# Patient Record
Sex: FEMALE | Race: ASIAN | NOT HISPANIC OR LATINO | Employment: UNEMPLOYED | URBAN - METROPOLITAN AREA
[De-identification: names, ages, dates, MRNs, and addresses within clinical notes are randomized per-mention and may not be internally consistent; named-entity substitution may affect disease eponyms.]

---

## 2023-03-06 ENCOUNTER — TELEPHONE (OUTPATIENT)
Dept: FAMILY MEDICINE | Facility: CLINIC | Age: 18
End: 2023-03-06
Payer: COMMERCIAL

## 2023-03-06 NOTE — TELEPHONE ENCOUNTER
Medication Question or Refill    Contacts       Type Contact Phone/Fax    03/06/2023 04:43 PM CST Phone (Incoming) Cyndie Mae (Self) [phone] (H)          What medication are you calling about (include dose and sig)?: SERTRALINE 50 MG, ONCE A DAY     Preferred Pharmacy:   [facility]   [address] [place] [address]  [phone]          Controlled Substance Agreement on file:   CSA -- Patient Level:    CSA: None found at the patient level.       Who prescribed the medication?: UNKNOWN    Do you need a refill? Yes    When did you use the medication last? TODAY,3/6/23    Patient offered an appointment? Yes: SHE SCHEDULED AN APPOINTMENT ON 3/20/23    Do you have any questions or concerns?  Yes: SHE ONLY HAS 10 PILLS LEFT AND IS CONCERNED ABOUT BEING OFF THE MEDICATION      Okay to leave a detailed message?: Yes at Cell number on file:    Telephone Information:   Mobile [phone]

## 2023-03-07 NOTE — TELEPHONE ENCOUNTER
Sertraline is not on med list. Patient is scheduled with Nemo Myers on 3/20/23.Nemo Bourne MA/JOANIE

## 2023-03-07 NOTE — TELEPHONE ENCOUNTER
Called and spoke with patient regarding refill request for Sertraline.  Patient is not established with , last seen as child back in 2013.  Has been doctoring with providers with [facility].  provider is one who last prescribed patient the Sertraline.  Patient looking to transfer to , has apt to establish care with Kristen Kehr, PA-C on 3/20/23.  Patient noting she will run out of her current supply of Sertraline, prior to apt on 3/20.    Since patient has never been seen by provider, Kehr, is needing to establish care with , recommended patient to contact her previous prescribing provider with  to request a refill or extension on Sertraline, to last at least until upcoming visit with new provider here at .  Our office would not be able to provide refill for patient without visit and establishing care first.    Patient voiced good understanding, will contact her previous clinic, [facility], and inquire about short term refill on Sertraline.  Will start with  with initial visit on 3/20/23.      Jennifer Palumbo RN  [facility]

## 2023-03-20 ENCOUNTER — OFFICE VISIT (OUTPATIENT)
Dept: FAMILY MEDICINE | Facility: CLINIC | Age: 18
End: 2023-03-20
Payer: COMMERCIAL

## 2023-03-20 VITALS
BODY MASS INDEX: 28.82 KG/M2 | OXYGEN SATURATION: 94 % | HEART RATE: 80 BPM | DIASTOLIC BLOOD PRESSURE: 86 MMHG | RESPIRATION RATE: 20 BRPM | HEIGHT: 65 IN | TEMPERATURE: 98.5 F | SYSTOLIC BLOOD PRESSURE: 138 MMHG | WEIGHT: 173 LBS

## 2023-03-20 DIAGNOSIS — F32.A ANXIETY AND DEPRESSION: Primary | ICD-10-CM

## 2023-03-20 DIAGNOSIS — F41.9 ANXIETY AND DEPRESSION: Primary | ICD-10-CM

## 2023-03-20 PROCEDURE — 99204 OFFICE O/P NEW MOD 45 MIN: CPT | Performed by: PHYSICIAN ASSISTANT

## 2023-03-20 RX ORDER — SERTRALINE HYDROCHLORIDE 100 MG/1
100 TABLET, FILM COATED ORAL DAILY
Qty: 90 TABLET | Refills: 0 | Status: SHIPPED | OUTPATIENT
Start: 2023-03-20 | End: 2023-06-20

## 2023-03-20 ASSESSMENT — ANXIETY QUESTIONNAIRES
1. FEELING NERVOUS, ANXIOUS, OR ON EDGE: NEARLY EVERY DAY
IF YOU CHECKED OFF ANY PROBLEMS ON THIS QUESTIONNAIRE, HOW DIFFICULT HAVE THESE PROBLEMS MADE IT FOR YOU TO DO YOUR WORK, TAKE CARE OF THINGS AT HOME, OR GET ALONG WITH OTHER PEOPLE: VERY DIFFICULT
7. FEELING AFRAID AS IF SOMETHING AWFUL MIGHT HAPPEN: NEARLY EVERY DAY
6. BECOMING EASILY ANNOYED OR IRRITABLE: NEARLY EVERY DAY
3. WORRYING TOO MUCH ABOUT DIFFERENT THINGS: NEARLY EVERY DAY
7. FEELING AFRAID AS IF SOMETHING AWFUL MIGHT HAPPEN: NEARLY EVERY DAY
GAD7 TOTAL SCORE: 21
2. NOT BEING ABLE TO STOP OR CONTROL WORRYING: NEARLY EVERY DAY
5. BEING SO RESTLESS THAT IT IS HARD TO SIT STILL: NEARLY EVERY DAY
8. IF YOU CHECKED OFF ANY PROBLEMS, HOW DIFFICULT HAVE THESE MADE IT FOR YOU TO DO YOUR WORK, TAKE CARE OF THINGS AT HOME, OR GET ALONG WITH OTHER PEOPLE?: VERY DIFFICULT
4. TROUBLE RELAXING: NEARLY EVERY DAY
GAD7 TOTAL SCORE: 21
GAD7 TOTAL SCORE: 21

## 2023-03-20 ASSESSMENT — PATIENT HEALTH QUESTIONNAIRE - PHQ9
SUM OF ALL RESPONSES TO PHQ QUESTIONS 1-9: 23
10. IF YOU CHECKED OFF ANY PROBLEMS, HOW DIFFICULT HAVE THESE PROBLEMS MADE IT FOR YOU TO DO YOUR WORK, TAKE CARE OF THINGS AT HOME, OR GET ALONG WITH OTHER PEOPLE: SOMEWHAT DIFFICULT
SUM OF ALL RESPONSES TO PHQ QUESTIONS 1-9: 23

## 2023-03-20 ASSESSMENT — PAIN SCALES - GENERAL: PAINLEVEL: NO PAIN (0)
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
## 2023-03-20 NOTE — PROGRESS NOTES
"  Assessment & Plan     Anxiety and depression  She will increase the dose of the sertraline to 100 mg daily.   [facility] Jazmynearl Cheatham will be contacting to schedule and start counseling.   Possible transition to Psychiatry since more evaluation may be necessary for other mental health diagnosis.   I will see her back in 2-3 months and closely work with [facility].   - sertraline (ZOLOFT) 100 MG tablet; Take 1 tablet (100 mg) by mouth daily      30 minutes spent on the date of the encounter doing chart review, review of outside records, patient visit and documentation        BMI:   Estimated body mass index is 29.01 kg/m  as calculated from the following:    Height as of this encounter: 1.645 m (5' 4.75\").    Weight as of this encounter: 78.5 kg (173 lb).   Weight management plan: will discuss at future appointments    Depression Screening Follow Up    PHQ 3/20/2023   PHQ-9 Total Score 23   Q9: Thoughts of better off dead/self-harm past 2 weeks More than half the days   F/U: Thoughts of suicide or self-harm Yes   F/U: Self harm-plan No   F/U: Self-harm action No   F/U: Safety concerns No     Last PHQ-9 3/20/2023   1.  Little interest or pleasure in doing things 2   2.  Feeling down, depressed, or hopeless 3   3.  Trouble falling or staying asleep, or sleeping too much 3   4.  Feeling tired or having little energy 3   5.  Poor appetite or overeating 2   6.  Feeling bad about yourself 3   7.  Trouble concentrating 3   8.  Moving slowly or restless 2   Q9: Thoughts of better off dead/self-harm past 2 weeks 2   PHQ-9 Total Score 23   In the past two weeks have you had thoughts of suicide or self harm? Yes   Do you have concerns about your personal safety or the safety of others? No   In the past 2 weeks have you thought about a plan or had intention to harm yourself? No   In the past 2 weeks have you acted on these thoughts in any way? No               Follow Up      Follow Up Actions Taken  see above, [facility] referral    Discussed " the following ways the patient can remain in a safe environment:  be around others      Return in about 3 months (around 6/20/2023) for depression / anxiety.    Kristen M. Kehr, PA-C  [facility]   Cyndie is a 17 year old, presenting for the following health issues:  Depression and Anxiety      Cyndie has been a patient at [facility] and treated with sertraline 50 mg daily.     She had an insurance change and is establishing here at [facility] for continued treatment.     She was diagnosed with anxiety and depression. She has been seen by counseling at school, but does not sound like she has had mental health counseling. Her counselor at school is mainly helping with her classes and trying to transition to a different major. She is attending college at [employer]. She was previously studying pre-engineering, but is in the process of changing majors. She found the load of her classes in engineering was too stressful.    She lives with her grandparents in [place]. She previously lived in [place].   She was born in [place]. She recalls moving to [place] to live her her grandparents when she was in 2nd grade. She does not know why, but is aware that her father had alcohol abuse. She eventually moved [facility], to [place] with her parents, but returned to [place] to permanently stay with her grandparents when she was in middle school. She graduated from [employer]. She recalls having panic symptoms and seeing the school nurse as early as elementary school, but was never treated by mental health or counseling. She was able to keep up with her class load during high school. She reports having good grades. College transition was difficult and this is when she decided to seek care for her mental health.   She has a stable home, no food insecurity.   She does not sleep well. Typically making lists and needing to complete all tasks before  she is able to rest.   She also reports feelings of depression with a history of self cutting, no current harmful behavior. She also has thoughts of not being here. No active thoughts of suicide.     She and her previous provider were discussing other mental health diagnosis such as autism, but she did not go forward with any evaluations because her insurance changed.     History of Present Illness       Mental Health Follow-up:  Patient presents to follow-up on Depression & Anxiety.Patient's depression since last visit has been:  Medium  The patient is having other symptoms associated with depression.  Patient's anxiety since last visit has been:  Bad  The patient is having other symptoms associated with anxiety.  Any significant life events: relationship concerns and other  Patient is feeling anxious or having panic attacks.  Patient has no concerns about alcohol or drug use.    She eats 0-1 servings of fruits and vegetables daily.She consumes 0 sweetened beverage(s) daily.She exercises with enough effort to increase her heart rate 9 or less minutes per day.  She exercises with enough effort to increase her heart rate 3 or less days per week. She is missing 1 dose(s) of medications per week.  She is not taking prescribed medications regularly due to remembering to take.    Today's PHQ-9         PHQ-9 Total Score: 23    PHQ-9 Q9 Thoughts of better off dead/self-harm past 2 weeks :   More than half the days  Thoughts of suicide or self harm: (P) Yes  Self-harm Plan:   (P) No  Self-harm Action:     (P) No  Safety concerns for self or others: (P) No    How difficult have these problems made it for you to do your work, take care of things at home, or get along with other people: Somewhat difficult  Today's BRAN-7 Score: 21     {ALERT  Recent PHQ-9 score indicates suicidal ideations         Review of Systems   Constitutional, HEENT, cardiovascular, pulmonary, GI, , musculoskeletal, neuro, skin, endocrine and psych  "systems are negative, except as otherwise noted.      Objective    /86   Pulse 80   Temp 98.5  F (36.9  C) (Tympanic)   Resp 20   Ht 1.645 m (5' 4.75\")   Wt 78.5 kg (173 lb)   SpO2 94%   BMI 29.01 kg/m    Body mass index is 29.01 kg/m .  Physical Exam   GENERAL: healthy, alert and no distress  MS: no gross musculoskeletal defects noted, no edema  NEURO: Normal strength and tone, mentation intact and speech normal  PSYCH: mentation appears normal, affect normal/bright                    "

## 2023-03-20 NOTE — PATIENT INSTRUCTIONS
Call your insurance (contact on the back of your insurance card)    Ask where you can be seen for Psychiatry - ? Testing for autism / counseling / evaluation.         NATE Watts, [facility]  Behavioral Health Clinician  [facility]      Two says to schedule:  1)  - Ask to schedule a NEW appoinment with Jazmyn Cheatham the [facility]  2) Scheduling line - Call [phone] and request a NEW appoinment with dillon Watts [facility] at the [facility]

## 2023-03-20 NOTE — NURSING NOTE
"Chief Complaint   Patient presents with     Depression     Anxiety       Initial /86   Pulse 80   Temp 98.5  F (36.9  C) (Tympanic)   Resp 20   Ht 1.645 m (5' 4.75\")   Wt 78.5 kg (173 lb)   SpO2 94%   BMI 29.01 kg/m   Estimated body mass index is 29.01 kg/m  as calculated from the following:    Height as of this encounter: 1.645 m (5' 4.75\").    Weight as of this encounter: 78.5 kg (173 lb).  Medication Reconciliation: complete    MARCO Benitez MA    "

## 2023-04-30 ENCOUNTER — HEALTH MAINTENANCE LETTER (OUTPATIENT)
Age: 18
End: 2023-04-30

## 2023-06-20 ENCOUNTER — TELEPHONE (OUTPATIENT)
Dept: FAMILY MEDICINE | Facility: CLINIC | Age: 18
End: 2023-06-20

## 2023-06-20 ENCOUNTER — OFFICE VISIT (OUTPATIENT)
Dept: FAMILY MEDICINE | Facility: CLINIC | Age: 18
End: 2023-06-20
Payer: COMMERCIAL

## 2023-06-20 VITALS
WEIGHT: 184 LBS | TEMPERATURE: 97.7 F | OXYGEN SATURATION: 99 % | HEIGHT: 65 IN | RESPIRATION RATE: 18 BRPM | HEART RATE: 88 BPM | DIASTOLIC BLOOD PRESSURE: 71 MMHG | BODY MASS INDEX: 30.66 KG/M2 | SYSTOLIC BLOOD PRESSURE: 107 MMHG

## 2023-06-20 DIAGNOSIS — F32.A ANXIETY AND DEPRESSION: ICD-10-CM

## 2023-06-20 DIAGNOSIS — F41.9 ANXIETY AND DEPRESSION: ICD-10-CM

## 2023-06-20 PROCEDURE — 99213 OFFICE O/P EST LOW 20 MIN: CPT | Performed by: PHYSICIAN ASSISTANT

## 2023-06-20 RX ORDER — SERTRALINE HYDROCHLORIDE 100 MG/1
50 TABLET, FILM COATED ORAL DAILY
Qty: 135 TABLET | Refills: 0 | Status: SHIPPED | OUTPATIENT
Start: 2023-06-20 | End: 2023-06-20

## 2023-06-20 RX ORDER — SERTRALINE HYDROCHLORIDE 100 MG/1
150 TABLET, FILM COATED ORAL DAILY
Qty: 135 TABLET | Refills: 1 | Status: SHIPPED | OUTPATIENT
Start: 2023-06-20 | End: 2023-09-20

## 2023-06-20 ASSESSMENT — ANXIETY QUESTIONNAIRES
GAD7 TOTAL SCORE: 14
7. FEELING AFRAID AS IF SOMETHING AWFUL MIGHT HAPPEN: MORE THAN HALF THE DAYS
5. BEING SO RESTLESS THAT IT IS HARD TO SIT STILL: NEARLY EVERY DAY
1. FEELING NERVOUS, ANXIOUS, OR ON EDGE: SEVERAL DAYS
4. TROUBLE RELAXING: NEARLY EVERY DAY
IF YOU CHECKED OFF ANY PROBLEMS ON THIS QUESTIONNAIRE, HOW DIFFICULT HAVE THESE PROBLEMS MADE IT FOR YOU TO DO YOUR WORK, TAKE CARE OF THINGS AT HOME, OR GET ALONG WITH OTHER PEOPLE: SOMEWHAT DIFFICULT
7. FEELING AFRAID AS IF SOMETHING AWFUL MIGHT HAPPEN: MORE THAN HALF THE DAYS
8. IF YOU CHECKED OFF ANY PROBLEMS, HOW DIFFICULT HAVE THESE MADE IT FOR YOU TO DO YOUR WORK, TAKE CARE OF THINGS AT HOME, OR GET ALONG WITH OTHER PEOPLE?: SOMEWHAT DIFFICULT
3. WORRYING TOO MUCH ABOUT DIFFERENT THINGS: MORE THAN HALF THE DAYS
GAD7 TOTAL SCORE: 14
2. NOT BEING ABLE TO STOP OR CONTROL WORRYING: MORE THAN HALF THE DAYS
6. BECOMING EASILY ANNOYED OR IRRITABLE: SEVERAL DAYS

## 2023-06-20 ASSESSMENT — PATIENT HEALTH QUESTIONNAIRE - PHQ9
SUM OF ALL RESPONSES TO PHQ QUESTIONS 1-9: 14
SUM OF ALL RESPONSES TO PHQ QUESTIONS 1-9: 14
10. IF YOU CHECKED OFF ANY PROBLEMS, HOW DIFFICULT HAVE THESE PROBLEMS MADE IT FOR YOU TO DO YOUR WORK, TAKE CARE OF THINGS AT HOME, OR GET ALONG WITH OTHER PEOPLE: SOMEWHAT DIFFICULT

## 2023-06-20 ASSESSMENT — PAIN SCALES - GENERAL: PAINLEVEL: NO PAIN (0)

## 2023-06-20 ASSESSMENT — ENCOUNTER SYMPTOMS: NERVOUS/ANXIOUS: 1

## 2023-06-20 NOTE — TELEPHONE ENCOUNTER
I sent a new prescription   I did have the incorrect dose. She is to take 150 mg daily   Kristen Kehr PA-C

## 2023-06-20 NOTE — TELEPHONE ENCOUNTER
Fax message: Please clarify SIG for sertraline (ZOLOFT) 100 MG tablet. Pt states this is supposed to be 1.5 tabs per day. Please verify and send ASAP. Thanks!

## 2023-06-20 NOTE — PROGRESS NOTES
Assessment & Plan     Anxiety and depression  Increase the dose of the sertraline to 150 mg daily.   Recheck in 3 months with video or in person visit. She has some difficulty with transportation.   She was given information for counseling once again. I will reach out to [facility] here in [place] to try to contact again.   - sertraline (ZOLOFT) 100 MG tablet; Take 0.5 tablets (50 mg) by mouth daily  - Adult Mental Health  Referral; Future             Depression Screening Follow Up        6/20/2023    11:46 AM   PHQ   PHQ-9 Total Score 14   Q9: Thoughts of better off dead/self-harm past 2 weeks Several days   F/U: Thoughts of suicide or self-harm No   F/U: Safety concerns No         6/20/2023    11:46 AM   Last PHQ-9   1.  Little interest or pleasure in doing things 1   2.  Feeling down, depressed, or hopeless 1   3.  Trouble falling or staying asleep, or sleeping too much 3   4.  Feeling tired or having little energy 3   5.  Poor appetite or overeating 1   6.  Feeling bad about yourself 2   7.  Trouble concentrating 1   8.  Moving slowly or restless 1   Q9: Thoughts of better off dead/self-harm past 2 weeks 1   PHQ-9 Total Score 14   In the past two weeks have you had thoughts of suicide or self harm? No   Do you have concerns about your personal safety or the safety of others? No               Follow Up      Follow Up Actions Taken  Crisis resource information provided in the After Visit Summary  Scheduled appointment with [facility]  Mental Health Referral placed    Discussed the following ways the patient can remain in a safe environment:  be around others and she feels safe in her home      Kristen M. Kehr, PA-C  [facility]   Cyndie is a 17 year old, presenting for the following health issues:  Depression and Anxiety    Cyndie returns today for follow up of anxiety.   She is having fewer panic symptoms and less intensity with panic when she has them.  She feels that stressors  "in her life have improved in turn making her anxiety better also.   She did not connect with counseling here in [place] or [facility].   She is taking sertraline 100 mg daily.   No suicidal thoughts, sometimes feels that life would be better if she was not here.   No thoughts of harm to others or herself.         6/20/2023    12:01 PM   Additional Questions   Roomed by Ray     Anxiety    History of Present Illness       Mental Health Follow-up:  Patient presents to follow-up on Depression & Anxiety.Patient's depression since last visit has been:  Better  The patient is not having other symptoms associated with depression.  Patient's anxiety since last visit has been:  Medium  The patient is not having other symptoms associated with anxiety.  Any significant life events: No  Patient is feeling anxious or having panic attacks.  Patient has no concerns about alcohol or drug use.    She eats 0-1 servings of fruits and vegetables daily.She consumes 0 sweetened beverage(s) daily.She exercises with enough effort to increase her heart rate 9 or less minutes per day.  She exercises with enough effort to increase her heart rate 3 or less days per week. She is missing 2 dose(s) of medications per week.  She is not taking prescribed medications regularly due to remembering to take.    Today's PHQ-9         PHQ-9 Total Score: 14    PHQ-9 Q9 Thoughts of better off dead/self-harm past 2 weeks :   Several days  Thoughts of suicide or self harm: (P) No  Self-harm Plan:     Self-harm Action:       Safety concerns for self or others: (P) No    How difficult have these problems made it for you to do your work, take care of things at home, or get along with other people: Somewhat difficult  Today's BRAN-7 Score: 14                 Review of Systems   Psychiatric/Behavioral: The patient is nervous/anxious.             Objective    /71   Pulse 88   Temp 97.7  F (36.5  C) (Tympanic)   Resp 18   Ht 1.645 m (5' 4.76\")   Wt " 83.5 kg (184 lb)   SpO2 99%   BMI 30.84 kg/m    Body mass index is 30.84 kg/m .  Physical Exam   GENERAL: healthy, alert and no distress  PSYCH: mentation appears normal, affect normal/bright, anxious, judgement and insight intact and appearance well groomed

## 2023-06-20 NOTE — PATIENT INSTRUCTIONS
NATE Watts, [facility]  Behavioral Health Clinician  [facility]      Two says to schedule:  1)  - Ask to schedule a NEW appoinment with dillon Watts [facility]  2) Scheduling line - Call [phone] and request a NEW appoinment with dillon Watts [facility] at the [facility]

## 2023-06-28 ENCOUNTER — VIRTUAL VISIT (OUTPATIENT)
Dept: BEHAVIORAL HEALTH | Facility: CLINIC | Age: 18
End: 2023-06-28
Payer: COMMERCIAL

## 2023-06-28 DIAGNOSIS — F41.1 GENERALIZED ANXIETY DISORDER: Primary | ICD-10-CM

## 2023-06-28 DIAGNOSIS — F43.9 TRAUMA AND STRESSOR-RELATED DISORDER: ICD-10-CM

## 2023-06-28 DIAGNOSIS — F33.1 MODERATE EPISODE OF RECURRENT MAJOR DEPRESSIVE DISORDER (H): ICD-10-CM

## 2023-06-28 PROCEDURE — 90791 PSYCH DIAGNOSTIC EVALUATION: CPT | Mod: VID

## 2023-06-28 ASSESSMENT — COLUMBIA-SUICIDE SEVERITY RATING SCALE - C-SSRS
2. HAVE YOU ACTUALLY HAD ANY THOUGHTS OF KILLING YOURSELF?: YES
2. HAVE YOU ACTUALLY HAD ANY THOUGHTS OF KILLING YOURSELF?: HEAD BANGING
1. HAVE YOU WISHED YOU WERE DEAD OR WISHED YOU COULD GO TO SLEEP AND NOT WAKE UP?: YES
1. IN THE PAST MONTH, HAVE YOU WISHED YOU WERE DEAD OR WISHED YOU COULD GO TO SLEEP AND NOT WAKE UP?: YES
2. HAVE YOU ACTUALLY HAD ANY THOUGHTS OF KILLING YOURSELF?: NO

## 2023-06-28 ASSESSMENT — PATIENT HEALTH QUESTIONNAIRE - PHQ9
10. IF YOU CHECKED OFF ANY PROBLEMS, HOW DIFFICULT HAVE THESE PROBLEMS MADE IT FOR YOU TO DO YOUR WORK, TAKE CARE OF THINGS AT HOME, OR GET ALONG WITH OTHER PEOPLE: SOMEWHAT DIFFICULT
SUM OF ALL RESPONSES TO PHQ QUESTIONS 1-9: 16
SUM OF ALL RESPONSES TO PHQ QUESTIONS 1-9: 16
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
## 2023-06-28 NOTE — Clinical Note
Frequency EOW - dx trauma and stressor, BRAN, MDD moderate. Passive and chronic SI with no plan, intent, method. Lots of childhood complexities leading into adulthood. Suspect ASD - considering sensory evaluation through OT and care coordination for adult mental health case management and coordinate a neuropsych. - Jazmyn, [facility]

## 2023-07-10 ENCOUNTER — VIRTUAL VISIT (OUTPATIENT)
Dept: PSYCHOLOGY | Facility: CLINIC | Age: 18
End: 2023-07-10
Attending: PHYSICIAN ASSISTANT
Payer: COMMERCIAL

## 2023-07-10 DIAGNOSIS — F41.9 ANXIETY AND DEPRESSION: ICD-10-CM

## 2023-07-10 DIAGNOSIS — F32.A ANXIETY AND DEPRESSION: ICD-10-CM

## 2023-07-10 PROCEDURE — 90834 PSYTX W PT 45 MINUTES: CPT | Mod: VID

## 2023-07-10 ASSESSMENT — ANXIETY QUESTIONNAIRES
6. BECOMING EASILY ANNOYED OR IRRITABLE: SEVERAL DAYS
GAD7 TOTAL SCORE: 14
2. NOT BEING ABLE TO STOP OR CONTROL WORRYING: SEVERAL DAYS
IF YOU CHECKED OFF ANY PROBLEMS ON THIS QUESTIONNAIRE, HOW DIFFICULT HAVE THESE PROBLEMS MADE IT FOR YOU TO DO YOUR WORK, TAKE CARE OF THINGS AT HOME, OR GET ALONG WITH OTHER PEOPLE: SOMEWHAT DIFFICULT
1. FEELING NERVOUS, ANXIOUS, OR ON EDGE: MORE THAN HALF THE DAYS
5. BEING SO RESTLESS THAT IT IS HARD TO SIT STILL: NEARLY EVERY DAY
GAD7 TOTAL SCORE: 14
7. FEELING AFRAID AS IF SOMETHING AWFUL MIGHT HAPPEN: SEVERAL DAYS
3. WORRYING TOO MUCH ABOUT DIFFERENT THINGS: NEARLY EVERY DAY
4. TROUBLE RELAXING: NEARLY EVERY DAY

## 2023-07-10 ASSESSMENT — COLUMBIA-SUICIDE SEVERITY RATING SCALE - C-SSRS
2. HAVE YOU ACTUALLY HAD ANY THOUGHTS OF KILLING YOURSELF?: NO
1. SINCE LAST CONTACT, HAVE YOU WISHED YOU WERE DEAD OR WISHED YOU COULD GO TO SLEEP AND NOT WAKE UP?: NO

## 2023-07-10 ASSESSMENT — PATIENT HEALTH QUESTIONNAIRE - PHQ9
10. IF YOU CHECKED OFF ANY PROBLEMS, HOW DIFFICULT HAVE THESE PROBLEMS MADE IT FOR YOU TO DO YOUR WORK, TAKE CARE OF THINGS AT HOME, OR GET ALONG WITH OTHER PEOPLE: SOMEWHAT DIFFICULT
SUM OF ALL RESPONSES TO PHQ QUESTIONS 1-9: 13
SUM OF ALL RESPONSES TO PHQ QUESTIONS 1-9: 13

## 2023-07-10 NOTE — PROGRESS NOTES
"[facility]                                     Progress Note    Patient Name: Cyndie Mae  Date: 7/10/23         Service Type: Individual      Session Start Time: 12:30 pm  Session End Time: 1:21 pm     Session Length: 51 minutes       Session #: 1    Attendees: Client attended alone    Service Modality:  Video Visit:      Provider verified identity through the following two step process.  Patient provided:  Patient     Telemedicine Visit: The patient's condition can be safely assessed and treated via synchronous audio and visual telemedicine encounter.      Reason for Telemedicine Visit: Patient convenience (e.g. access to timely appointments / distance to available provider)    Originating Site (Patient Location): Patient's home    Distant Site (Provider Location): Provider Remote Setting- Home Office    Consent:  The patient/guardian has verbally consented to: the potential risks and benefits of telemedicine (video visit) versus in person care; bill my insurance or make self-payment for services provided; and responsibility for payment of non-covered services.     Patient would like the video invitation sent by:  My Chart    Mode of Communication:  Video Conference via [employer]    Distant Location (Provider):  Off-site    As the provider I attest to compliance with applicable laws and regulations related to telemedicine.    DATA  Interactive Complexity: No  Crisis: No        Progress Since Last Session (Related to Symptoms / Goals / Homework):   Symptoms: No change : Symptoms are the same since Diagnostic Assessment on 23    Homework: NA.This is first session with this provider.      Episode of Care Goals: NA     Current / Ongoing Stressors and Concerns:  Pt is experiencing increase stress and anxiety. Pt stated this is due mainly to dealing with family \"drama\" and helping taking care of grandpa. Pt reports her relationship with her older sister is going through some tension " at the moment which is stressful for the pt. Pt noted not been able to sleep well at night due to noises at night since dash has a lot of health issues. Pt took time off school to deal with her stress levels. Pt explained feeling fustrated and as if her family member do not listen to her.       Treatment Objective(s) Addressed in This Session:   use relaxation strategies 2 times per day to reduce the physical symptoms of anxiety.     Intervention:   Mindfulness- breathing exercises     Assessments completed prior to visit:  The following assessments were completed by patient for this visit:  PHQ9:       3/20/2023     2:36 AM 6/20/2023    11:46 AM 6/28/2023     1:00 AM 7/10/2023     1:03 AM   PHQ-9 SCORE   PHQ-9 Total Score MyChart 23 (Severe depression) 14 (Moderate depression) 16 (Moderately severe depression) 13 (Moderate depression)   PHQ-9 Total Score 23 14 16 13     GAD7:       3/20/2023     2:42 AM 6/20/2023    12:44 AM 7/10/2023     1:04 AM   BRAN-7 SCORE   Total Score 21 (severe anxiety) 14 (moderate anxiety) 14 (moderate anxiety)   Total Score 21 14 14     CAGE-AID:       6/28/2023     1:19 AM   CAGE-AID Total Score   Total Score 4   Total Score MyChart 4 (A total score of 2 or greater is considered clinically significant)     PROMIS 10-Global Health (all questions and answers displayed):       6/28/2023     1:18 AM 7/10/2023     1:05 AM   PROMIS 10   In general, would you say your health is: Fair Good   In general, would you say your quality of life is: Fair Fair   In general, how would you rate your physical health? Fair Good   In general, how would you rate your mental health, including your mood and your ability to think? Fair Good   In general, how would you rate your satisfaction with your social activities and relationships? Good Excellent   In general, please rate how well you carry out your usual social activities and roles Very good Excellent   To what extent are you able to carry out your  everyday physical activities such as walking, climbing stairs, carrying groceries, or moving a chair? Completely Completely   In the past 7 days, how often have you been bothered by emotional problems such as feeling anxious, depressed, or irritable? Often Often   In the past 7 days, how would you rate your fatigue on average? Severe Moderate   In the past 7 days, how would you rate your pain on average, where 0 means no pain, and 10 means worst imaginable pain? 0 1   In general, would you say your health is: 2 3   In general, would you say your quality of life is: 2 2   In general, how would you rate your physical health? 2 3   In general, how would you rate your mental health, including your mood and your ability to think? 2 3   In general, how would you rate your satisfaction with your social activities and relationships? 3 5   In general, please rate how well you carry out your usual social activities and roles. (This includes activities at home, at work and in your community, and responsibilities as a parent, child, spouse, employee, friend, etc.) 4 5   To what extent are you able to carry out your everyday physical activities such as walking, climbing stairs, carrying groceries, or moving a chair? 5 5   In the past 7 days, how often have you been bothered by emotional problems such as feeling anxious, depressed, or irritable? 4 4   In the past 7 days, how would you rate your fatigue on average? 4 3   In the past 7 days, how would you rate your pain on average, where 0 means no pain, and 10 means worst imaginable pain? 0 1   Global Mental Health Score 9 12   Global Physical Health Score 14 15   PROMIS TOTAL - SUBSCORES 23 27     [place] Suicide Severity Rating Scale (Lifetime/Recent)      6/28/2023     3:29 PM   [place] Suicide Severity Rating (Lifetime/Recent)   1. Wish to be Dead (Lifetime) Y   1. Wish to be Dead (Past 1 Month) Y   Wish to be Dead Description (Past 1 Month) passive SI chronic   2.  Non-Specific Active Suicidal Thoughts (Lifetime) Y   Non-Specific Active Suicidal Thought Description (Lifetime) head banging   2. Non-Specific Active Suicidal Thoughts (Past 1 Month) N   Calculated C-SSRS Risk Score (Lifetime/Recent) Low Risk         ASSESSMENT: Current Emotional / Mental Status (status of significant symptoms):   Risk status (Self / Other harm or suicidal ideation)   Patient denies current fears or concerns for personal safety.   Patient reports the following current or recent suicidal ideation or behaviors: Fleeting thoughts .   Patient denies current or recent homicidal ideation or behaviors.   Patient denies current or recent self injurious behavior or ideation.   Patient denies other safety concerns.   Patient reports there has been no change in risk factors since their last session.     Patient reports there has been no change in protective factors since their last session.     Recommended that patient call 911 or go to the local ED should there be a change in any of these risk factors.     Appearance:   Appropriate    Eye Contact:   Good    Psychomotor Behavior: Normal    Attitude:   Cooperative    Orientation:   All   Speech    Rate / Production: Normal/ Responsive Normal     Volume:  Normal    Mood:    Normal   Affect:    Appropriate    Thought Content:  Clear    Thought Form:  Coherent    Insight:    Good      Medication Review:   No changes to current psychiatric medication(s)     Medication Compliance:   Yes     Changes in Health Issues:   None reported     Chemical Use Review:   Substance Use: Problem use continues with no change since last session, Patient declined discussion at this time        Tobacco Use: No current tobacco use.      Diagnosis:  1. Anxiety and depression      Collateral Reports Completed:   Not Applicable    PLAN: (Patient Tasks / Therapist Tasks / Other)  Therapist and pt will work on a treatment plan next session.                                                          June Lennon, [facility]  July 10, 2023  Note reviewed and clinical supervision by NATE Yung, [facility] 7/14/2023

## 2023-07-19 ENCOUNTER — VIRTUAL VISIT (OUTPATIENT)
Dept: BEHAVIORAL HEALTH | Facility: CLINIC | Age: 18
End: 2023-07-19
Payer: COMMERCIAL

## 2023-07-19 DIAGNOSIS — F33.1 MODERATE EPISODE OF RECURRENT MAJOR DEPRESSIVE DISORDER (H): ICD-10-CM

## 2023-07-19 DIAGNOSIS — F43.9 TRAUMA AND STRESSOR-RELATED DISORDER: ICD-10-CM

## 2023-07-19 DIAGNOSIS — F41.1 GENERALIZED ANXIETY DISORDER: Primary | ICD-10-CM

## 2023-07-19 PROCEDURE — 90834 PSYTX W PT 45 MINUTES: CPT | Mod: VID

## 2023-07-19 ASSESSMENT — PATIENT HEALTH QUESTIONNAIRE - PHQ9
SUM OF ALL RESPONSES TO PHQ QUESTIONS 1-9: 15
10. IF YOU CHECKED OFF ANY PROBLEMS, HOW DIFFICULT HAVE THESE PROBLEMS MADE IT FOR YOU TO DO YOUR WORK, TAKE CARE OF THINGS AT HOME, OR GET ALONG WITH OTHER PEOPLE: SOMEWHAT DIFFICULT
SUM OF ALL RESPONSES TO PHQ QUESTIONS 1-9: 15

## 2023-07-19 NOTE — PROGRESS NOTES
[facility]: Integrated Behavioral Health  July 19, 2023      Behavioral Health Clinician Progress Note    Patient Name: Cyndie Mae           Service Type:  Individual      Service Location:   Face to Face in Clinic     Session Start Time: 12P  Session End Time: 1240P      Session Length: 38 - 52      Attendees: Patient     Service Modality:  Video Visit:      Provider verified identity through the following two step process.  Patient provided:  Patient is known previously to provider    Telemedicine Visit: The patient's condition can be safely assessed and treated via synchronous audio and visual telemedicine encounter.      Reason for Telemedicine Visit: Patient has requested telehealth visit    Originating Site (Patient Location): Patient's home    Distant Site (Provider Location): [facility]    Consent:  The patient/guardian has verbally consented to: the potential risks and benefits of telemedicine (video visit) versus in person care; bill my insurance or make self-payment for services provided; and responsibility for payment of non-covered services.     Patient would like the video invitation sent by:  My Chart    Mode of Communication:  [facility] Conference [facility]    Distant Location (Provider):  On-site    As the provider I attest to compliance with applicable laws and regulations related to telemedicine.    Visit Activities (Refresh list every visit): NEW and [facility] Only    Diagnostic Assessment Date: 6/28/23  Treatment Plan Date: July 19, 2023  PROMIS (reviewed every 90 days):  6/28/23    DATA  Extended Session (60+ minutes): No  Interactive Complexity: No  Crisis: No  BH Patient: No    Treatment Objective(s) Addressed in This Session:    Depressed Mood: Increase interest, engagement, and pleasure in doing things  Decrease frequency and intensity of feeling down, depressed, hopeless  Identify negative self-talk and  behaviors: challenge core beliefs, myths, and actions  Improve concentration, focus, and mindfulness in daily activities   Feel less fidgety, restless or slow in daily activities / interpersonal interactions  Decrease thoughts that you'd be better off dead or of suicide / self-harm  Anxiety: will experience a reduction in anxiety, will develop more effective coping skills to manage anxiety symptoms, will develop healthy cognitive patterns and beliefs and will increase ability to function adaptively    Progress on Treatment Objective(s) / Homework:  New Objective established this session - PREPARATION (Decided to change - considering how); Intervened by negotiating a change plan and determining options / strategies for behavior change, identifying triggers, exploring social supports, and working towards setting a date to begin behavior change     Update: Processed through having to caregive at times for grandpa which increases stress level and has tried talking with grandma about this and she doesn't change anything about it. Pt also stated she will only shower at her sister's place because pt has fear of dirty bathrooms. Pt can't live with sister due to transportation barrier. Pt is working on getting her license. Mom is working on returning to [place]. Pt reports being frustrated about this decision. Discussed places within walking distance to apply to for a job. Explored more positive psychology, future career and aspirations. Get license, live with sister, get a job. Wants her own chickens and house one day. 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    Motivational Interviewing    MI Intervention: Expressed Empathy/Understanding, Supported Autonomy, Collaboration, Evocation, Permission to raise concern or advise, Open-ended questions and Reflections: simple and complex     Change Talk Expressed by the Patient: Desire to change Ability to  "change Reasons to change Need to change    Provider Response to Change Talk: E - Evoked more info from patient about behavior change, A - Affirmed patient's thoughts, decisions, or attempts at behavior change, R - Reflected patient's change talk and S - Summarized patient's change talk statements    CBT:  Discussed common cognitive distortions identified them in patient's life, Explored ways to challenge, replace, and act against these cognitions  PSYCHODYMANIC PSYCHOTHERAPY: Discussed patient's emotional dynamics and issues and how they impact behaviors,Explored patient's history of relationships and how they impact present behaviors, Explored how to work with and make changes in these schemas and patterns  SKILLS TRAINING: Explored skills useful to client in current situation Skills include assertiveness, communication, conflict management, problem-solving, relaxation, etc.  SOLUTION FOCUSED: Explored patterns in patient's relationships and discussed options for new behaviors, Explored patterns in patient's actions and choices and discussed options for new behaviors  MINDFULLNESS-BASED-STRATEGIES:  Discussed skills based on development and application of mindfulness, Skills drawn from dialectical behavior therapy, mindfulness-based stress reduction, mindfulness-based cognitive therapy, etc.  RELAXATION INTERVENTIONS: Provided education and modeled, deep breathing, Progressive Muscle Relaxation, Guided Imagery, provided werbsite handout to practice at home    Care Plan review completed: Yes    Medication Review:  No current psychiatric medications prescribed     Current Outpatient Medications   Medication     sertraline (ZOLOFT) 100 MG tablet     No current facility-administered medications for this visit.        Medication Compliance:  Yes - sometimes forget to take it, but \"getting better.\" Discussed reminders on the phone to take it. Given psycho-education on importance of taking consistently as it relates to " efficacy.    Changes in Health Issues:   None reported    Chemical Use Review:   Substance Use: Chemical use reviewed, no active concerns identified    Tobacco Use: No current tobacco use.      Assessment: Current Emotional / Mental Status (status of significant symptoms):    Safety Assessment:   Patient denies current homicidal ideation and behaviors.  Patient denies current self-injurious ideation and behaviors.    Patient denied risk behaviors associated with substance use.  Patient denies any high risk behaviors associated with mental health symptoms.  Patient reports the following current concerns for their personal safety: None.  Patient reports there are not firearms in the house.     A safety and risk management plan has not been developed at this time, however patient was encouraged to call [facility] / [facility] should there be a change in any of these risk factors.    Mental Status Exam:  Appearance:                Appropriate    Eye Contact:               Good   Psychomotor:              Restless       Gait / station:           no problem  Attitude / Demeanor:   Cooperative  Interested Friendly Pleasant Attentive  Speech      Rate / Production:   Normal/ Responsive Talkative      Volume:                   Normal  volume      Language:               intact  Mood:                          Anxious  Depressed  Sad  Dysphoric Anhedonia  Affect:                          Blunted    Thought Content:        Clear  Rumination  (pt states SI is passive and chronic)  Thought Process:        Coherent  Logical       Associations:           No loosening of associations  Insight:                         Good   Judgment:                   Intact   Orientation:                 All  Attention/concentration:          Good    Diagnoses:  1. Generalized anxiety disorder    2. Moderate episode of recurrent major depressive disorder (H)    3. Trauma and stressor-related disorder      Collateral Reports Completed:  Not  "Applicable    Plan: (Homework, other):  Patient was given information about behavioral services and encouraged to schedule a follow up appointment with the clinic [facility] as she is now established with another provider. Writer reminded pt that writer is available for future questions and mental health needs should they arise. Writer gave pt information on ASD testing in her mychart. She was also given CD Recommendations: No indications of CD issues.      Jazmyn Cheatham, JOSETTE, [facility] July 19, 2023      ______________________________________________________________________    Integrated Primary Care Behavioral Health Treatment Plan    Patient's Name: Cyndie Mae  YOB: 2005    Date of Creation: July 19, 2023  Date Treatment Plan Last Reviewed/Revised: NA    DSM5 Diagnoses:   296.32 (F33.1) Major Depressive Disorder, Recurrent Episode, Moderate   300.02 (F41.1) Generalized Anxiety Disorder  309.9 (F43.9) Unspecified Trauma and Stressor Related Disorder    Psychosocial / Contextual Factors:     Functional Status:  Patient reports the following functional impairments:  money management, relationship(s) and social interactions.     Nonprogrammatic care:  Patient is requesting basic services to address current mental health concerns.     Clinical Summary:  1. Reason for assessment: \"Anxiety and depression. There are also potential concerns for OCD and autism.\"  2. Psychosocial, Cultural and Contextual Factors: Pt lives with her grandparents, mother, and younger sister. She is currently a student. She has a long history including multiple moves and complex family unit dynamics.  6. Prognosis: Expect Improvement and Relieve Acute Symptoms.  7. Likely consequences of symptoms if not treated: higher level of care.  8. Client strengths include:  goal-focused, good listener, open to learning, open to suggestions / feedback, wants to learn and willing to ask questions .       Referral / " Collaboration:  Was/were discussed and patient will pursue-ASD testing    Anticipated number of session for this episode of care: PRN  Anticipation frequency of session: PRN  Anticipated Duration of each session: 16-37 minutes  Treatment plan will be reviewed in 90 days or when goals have been changed.       MeasurableTreatment Goal(s) related to diagnosis / functional impairment(s)    Goal:  Patient will reduce symptoms of depression and suicidal thinking and increase life functioning; effectively reduce depressive symptoms as evidenced by a reduced PHQ9 score of 5 or less with occurrence of several days or less.    Objective #A:  will experience a reduction in depressed mood, will develop more effective coping skills to manage depressive symptoms and will develop healthy cognitive patterns and beliefs   Client will Increase interest, engagement, and pleasure in doing things  Decrease frequency and intensity of feeling down, depressed, hopeless  Identify negative self-talk and behaviors: challenge core beliefs, myths, and actions  Decrease thoughts that you'd be better off dead or of suicide / self-harm.  Status: NEW July 19, 2023    Objective #B:  will increase ability to function adaptively and will continue to take medications as prescribed / participate in supportive activities and services   Client will Increase interest, engagement, and pleasure in doing things  Improve quantity and quality of night time sleep / decrease daytime naps  Feel less tired and more energy during the day    Improve diet, appetite, mindful eating, and / or meal planning  Identify negative self-talk and behaviors: challenge core beliefs, myths, and actions  Improve concentration, focus, and mindfulness in daily activities .  Status: NEW July 19, 2023    Objective #C:  will address relationship difficulties in a more adaptive manner  Client will examine relationship hx and learn skills to more effectively communicate and be  assertive.  Status: NEW July 19, 2023    Goal:  Patient will reduce symptoms and impacts of anxiety - generalized anxiety; effectively reduce anxiety symptoms as evidenced by a reduced GAD7 score of 5 or less with the occurrence of several days or less.    Objective #A:  will experience a reduction in anxiety, will develop  more effective coping skills to manage anxiety symptoms, will develop healthy cognitive patterns and beliefs and will increase ability to function adaptively              Client will use cognitive strategies identified in therapy to challenge anxious thoughts.  Status: NEW July 19, 2023    Objective #B:  will experience a reduction in anxiety, will develop more effective coping skills to manage anxiety symptoms, will develop healthy cognitive patterns and beliefs and will increase ability to function adaptively  Client will use relaxation strategies many times per day to reduce the physical symptoms of anxiety.  Status: NEW July 19, 2023    Intervention(s)  Psycho-education regarding mental health diagnoses and treatment options    Skills training    Explore skills useful to client in current situation    Skills include assertiveness, communication, conflict management, problem-solving, relaxation, etc.    Solution-Focused Therapy    Explore patterns in patient's relationships and discussed options for new behaviors    Explore patterns in patient's actions and choices and discussed options for new behaviors    Cognitive-behavioral Therapy    Discuss common cognitive distortions, identified them in patient's life    Explore ways to challenge, replace, and act against these cognitions    Acceptance and Commitment Therapy    Explore and identified important values in patient's life    Discuss ways to commit to behavioral activation around these values    Psychodynamic psychotherapy    Discuss patient's emotional dynamics and issues and how they impact behaviors    Explore patient's history of  relationships and how they impact present behaviors    Explore how to work with and make changes in these schemas and patterns    Behavioral Activation    Discuss steps patient can take to become more involved in meaningful activity    Identify barriers to these activities and explored possible solutions    Mindfulness-Based Strategies    Discuss skills based on development and application of mindfulness    Skills drawn from dialectical behavior therapy, mindfulness-based stress reduction, mindfulness-based cognitive therapy, etc.     Patient has reviewed and agreed to the above plan.      SYD HERNANDEZ, [facility]  July 19, 2023

## 2023-07-24 ENCOUNTER — VIRTUAL VISIT (OUTPATIENT)
Dept: PSYCHOLOGY | Facility: CLINIC | Age: 18
End: 2023-07-24
Payer: COMMERCIAL

## 2023-07-24 DIAGNOSIS — F41.1 GAD (GENERALIZED ANXIETY DISORDER): Primary | ICD-10-CM

## 2023-07-24 DIAGNOSIS — F33.1 MODERATE EPISODE OF RECURRENT MAJOR DEPRESSIVE DISORDER (H): ICD-10-CM

## 2023-07-24 DIAGNOSIS — F43.9 TRAUMA AND STRESSOR-RELATED DISORDER: ICD-10-CM

## 2023-07-24 PROCEDURE — 90834 PSYTX W PT 45 MINUTES: CPT | Mod: VID

## 2023-07-24 ASSESSMENT — ANXIETY QUESTIONNAIRES
IF YOU CHECKED OFF ANY PROBLEMS ON THIS QUESTIONNAIRE, HOW DIFFICULT HAVE THESE PROBLEMS MADE IT FOR YOU TO DO YOUR WORK, TAKE CARE OF THINGS AT HOME, OR GET ALONG WITH OTHER PEOPLE: SOMEWHAT DIFFICULT
1. FEELING NERVOUS, ANXIOUS, OR ON EDGE: NEARLY EVERY DAY
5. BEING SO RESTLESS THAT IT IS HARD TO SIT STILL: SEVERAL DAYS
3. WORRYING TOO MUCH ABOUT DIFFERENT THINGS: NEARLY EVERY DAY
2. NOT BEING ABLE TO STOP OR CONTROL WORRYING: MORE THAN HALF THE DAYS
6. BECOMING EASILY ANNOYED OR IRRITABLE: SEVERAL DAYS
4. TROUBLE RELAXING: NEARLY EVERY DAY
7. FEELING AFRAID AS IF SOMETHING AWFUL MIGHT HAPPEN: MORE THAN HALF THE DAYS
GAD7 TOTAL SCORE: 15
GAD7 TOTAL SCORE: 15

## 2023-07-31 NOTE — PROGRESS NOTES
[facility]                                     Progress Note    Patient Name: Cyndie Mae  Date: 23         Service Type: Individual      Session Start Time: 11:00 am Session End Time: 11:52     Session Length: 52 min    Session #: 2    Attendees: Client attended alone    Service Modality:  Video Visit:      Provider verified identity through the following two step process.  Patient provided:  Patient  and Patient address    Telemedicine Visit: The patient's condition can be safely assessed and treated via synchronous audio and visual telemedicine encounter.      Reason for Telemedicine Visit: Patient convenience (e.g. access to timely appointments / distance to available provider)    Originating Site (Patient Location): Patient's home    Distant Site (Provider Location): Provider Remote Setting- Home Office    Consent:  The patient/guardian has verbally consented to: the potential risks and benefits of telemedicine (video visit) versus in person care; bill my insurance or make self-payment for services provided; and responsibility for payment of non-covered services.     Patient would like the video invitation sent by:  My Chart    Mode of Communication:  Video Conference via [facility]    Distant Location (Provider):  Off-site    As the provider I attest to compliance with applicable laws and regulations related to telemedicine.    DATA  Interactive Complexity: No  Crisis: No      Progress Since Last Session (Related to Symptoms / Goals / Homework):   Symptoms: No change Symptoms remain the same.    Homework: Partially completed Pt engaged in deep breathing exercises sometimes.       Episode of Care Goals: No improvement - PREPARATION (Decided to change - considering how); Intervened by negotiating a change plan and determining options / strategies for behavior change, identifying triggers, exploring social supports, and working towards setting a date to begin behavior  change     Current / Ongoing Stressors and Concerns:  Pt has the stressor of living with family members and having to help taking care of her grandfather. Pt discussed how her family members do not seem to listen to her when she sets boundaries and speaks out her preferences. Pt noted the relationship with her mom is challenging. Pt shared that there has been times when family do not listen to her that cause her so much anxiety that she has thrown up.      Treatment Objective(s) Addressed in This Session:   use relaxation strategies 3 times per day to reduce the physical symptoms of anxiety     Intervention:   DBT: Distress tolerance.     Assessments completed prior to visit:  The following assessments were completed by patient for this visit:  PHQ9:       3/20/2023     2:36 AM 6/20/2023    11:46 AM 6/28/2023     1:00 AM 7/10/2023     1:03 AM 7/19/2023    11:33 AM   PHQ-9 SCORE   PHQ-9 Total Score MyChart 23 (Severe depression) 14 (Moderate depression) 16 (Moderately severe depression) 13 (Moderate depression) 15 (Moderately severe depression)   PHQ-9 Total Score 23 14 16 13 15     GAD7:       3/20/2023     2:42 AM 6/20/2023    12:44 AM 7/10/2023     1:04 AM 7/24/2023    10:25 AM   BRAN-7 SCORE   Total Score 21 (severe anxiety) 14 (moderate anxiety) 14 (moderate anxiety) 15 (severe anxiety)   Total Score 21 14 14 15     CAGE-AID:       6/28/2023     1:19 AM   CAGE-AID Total Score   Total Score 4   Total Score MyChart 4 (A total score of 2 or greater is considered clinically significant)     PROMIS 10-Global Health (all questions and answers displayed):       6/28/2023     1:18 AM 7/10/2023     1:05 AM 7/24/2023    10:26 AM   PROMIS 10   In general, would you say your health is: Fair Good Good   In general, would you say your quality of life is: Fair Fair Good   In general, how would you rate your physical health? Fair Good Good   In general, how would you rate your mental health, including your mood and your ability  to think? Fair Good Good   In general, how would you rate your satisfaction with your social activities and relationships? Good Excellent Very good   In general, please rate how well you carry out your usual social activities and roles Very good Excellent Good   To what extent are you able to carry out your everyday physical activities such as walking, climbing stairs, carrying groceries, or moving a chair? Completely Completely Completely   In the past 7 days, how often have you been bothered by emotional problems such as feeling anxious, depressed, or irritable? Often Often Often   In the past 7 days, how would you rate your fatigue on average? Severe Moderate Moderate   In the past 7 days, how would you rate your pain on average, where 0 means no pain, and 10 means worst imaginable pain? 0 1 1   In general, would you say your health is: 2 3 3   In general, would you say your quality of life is: 2 2 3   In general, how would you rate your physical health? 2 3 3   In general, how would you rate your mental health, including your mood and your ability to think? 2 3 3   In general, how would you rate your satisfaction with your social activities and relationships? 3 5 4   In general, please rate how well you carry out your usual social activities and roles. (This includes activities at home, at work and in your community, and responsibilities as a parent, child, spouse, employee, friend, etc.) 4 5 3   To what extent are you able to carry out your everyday physical activities such as walking, climbing stairs, carrying groceries, or moving a chair? 5 5 5   In the past 7 days, how often have you been bothered by emotional problems such as feeling anxious, depressed, or irritable? 4 4 4   In the past 7 days, how would you rate your fatigue on average? 4 3 3   In the past 7 days, how would you rate your pain on average, where 0 means no pain, and 10 means worst imaginable pain? 0 1 1   Global Mental Health Score 9 12 12    Global Physical Health Score 14 15 15   PROMIS TOTAL - SUBSCORES 23 27 27     [place] Suicide Severity Rating Scale (Lifetime/Recent)      6/28/2023     3:29 PM   [place] Suicide Severity Rating (Lifetime/Recent)   Q1 Wish to be Dead (Lifetime) Y   1. Wish to be Dead (Past 1 Month) Y   Wish to be Dead Description (Past 1 Month) passive SI chronic   Q2 Non-Specific Active Suicidal Thoughts (Lifetime) Y   Non-Specific Active Suicidal Thought Description (Lifetime) head banging   2. Non-Specific Active Suicidal Thoughts (Past 1 Month) N   Calculated C-SSRS Risk Score (Lifetime/Recent) Low Risk         ASSESSMENT: Current Emotional / Mental Status (status of significant symptoms):   Risk status (Self / Other harm or suicidal ideation)   Patient denies current fears or concerns for personal safety.   Patient denies current or recent suicidal ideation or behaviors.   Patient denies current or recent homicidal ideation or behaviors.   Patient denies current or recent self injurious behavior or ideation.   Patient denies other safety concerns.   Patient reports there has been no change in risk factors since their last session.     Patient reports there has been no change in protective factors since their last session.     Recommended that patient call 911 or go to the local ED should there be a change in any of these risk factors.     Appearance:   Appropriate    Eye Contact:   Good    Psychomotor Behavior: Normal    Attitude:   Cooperative    Orientation:   All   Speech    Rate / Production: Normal/ Responsive Normal     Volume:  Normal    Mood:    Normal   Affect:    Appropriate    Thought Content:  Clear    Thought Form:  Coherent    Insight:    Good      Medication Review:   No changes to current psychiatric medication(s)     Medication Compliance:   Yes     Changes in Health Issues:   None reported     Chemical Use Review:   Substance Use: Chemical use reviewed, no active concerns identified Pt denied substance use  and stated she must of filled out the CAGE questionnaire wrong.    Tobacco Use: No current tobacco use.      Diagnosis:  1. BRAN (generalized anxiety disorder)    2. Moderate episode of recurrent major depressive disorder (H)    3. Trauma and stressor-related disorder        Collateral Reports Completed:   Not Applicable    PLAN: (Patient Tasks / Therapist Tasks / Other)  Pt to continue to engage in mindfulness activities & deep breathing exercises.   Therapist will bring DBT worksheets next session for pt's use.     June Lennon, [facility] 7/24/23   Note reviewed and clinical supervision by NATE Yung, [facility] 8/3/2023     _____________________________________________________________    Individual Treatment Plan    Patient's Name: Cyndie Mae  YOB: 2005    Date of Creation: 7/19/23  Date Treatment Plan Last Reviewed/Revised: 7/24/23    DSM5 Diagnoses: 296.32 (F33.1) Major Depressive Disorder, Recurrent Episode, Moderate _ and With anxious distress, 300.02 (F41.1) Generalized Anxiety Disorder, or 309.9 (F43.9) Unspecified Trauma and Stressor Related Disorder  Psychosocial / Contextual Factors: Pt lives with grandparents. Pt has a complex family unit dynamics.   PROMIS (reviewed every 90 days): 10/24/23    Referral / Collaboration:  Referral to another professional/service is not indicated at this time.    Anticipated number of session for this episode of care: 9-12 sessions  Anticipation frequency of session: Biweekly  Anticipated Duration of each session: 38-52 minutes  Treatment plan will be reviewed in 90 days or when goals have been changed.     MeasurableTreatment Goal(s) related to diagnosis / functional impairment(s)  Goal 1: Patient will experience a reduction of the intensity/duration of anxiety and its occurrence. Impacts of anxiety (e.g.Vomiting) will subside.      I will know I've met my goal when I don't have panic attacks.      Objective #A (Patient  Action)    Patient will will experience a reduction in anxiety, will develop   more effective coping skills to manage anxiety symptoms, will develop healthy cognitive patterns and beliefs and will increase ability to function adaptively              Client will use cognitive strategies identified in therapy to challenge anxious thoughts.  Client will use relaxation strategies many times per day to reduce the physical symptoms of anxiety.    Status: Continued - Date(s): 7/24/23    Intervention(s)  Therapist will teach  CBT skills to challenge intrusive thoughts  .Therapist will teach pt Mindfulness skills.    Objective #B  Patient will address relationship difficulties in a more adaptive manner. Patient will examine relationship hx and learn skills to more effectively communicate and be assertive.   Status: Continued - Date(s): 7/24/23    Intervention(s)  Therapist will teach assertiveness skills. Therapist will help pt set healthy boundaries .    Objective #C  Patient will identify negative self-talk and behaviors: challenge core beliefs, myths, and actions. Decrease frequency and intensity of feeling down, depressed, hopeless.    Status: Continued - Date(s): 7/24/23     Intervention(s)  Therapist will help pt identify negative core belief and cognitive distortions. Therapist will help pt to develop healthy cognitive patterns and beliefs.        Patient has reviewed and agreed to the above plan.      June Lennon, [facility]  July 24, 2023  Treatment plan reviewed and clinical supervision by NATE Yung, [facility] 8/3/2023

## 2023-08-07 ENCOUNTER — VIRTUAL VISIT (OUTPATIENT)
Dept: PSYCHOLOGY | Facility: CLINIC | Age: 18
End: 2023-08-07
Payer: COMMERCIAL

## 2023-08-07 DIAGNOSIS — F41.1 GAD (GENERALIZED ANXIETY DISORDER): Primary | ICD-10-CM

## 2023-08-07 DIAGNOSIS — F33.1 MODERATE EPISODE OF RECURRENT MAJOR DEPRESSIVE DISORDER (H): ICD-10-CM

## 2023-08-07 DIAGNOSIS — F43.9 TRAUMA AND STRESSOR-RELATED DISORDER: ICD-10-CM

## 2023-08-07 PROCEDURE — 90837 PSYTX W PT 60 MINUTES: CPT | Mod: VID

## 2023-08-07 NOTE — PROGRESS NOTES
[facility]                                     Progress Note    Patient Name: Cyndie Mae  Date: 23       Service Type: Individual      Session Start Time: 11:00 am Session End Time: 11:55     Session Length: 55 min    Session #: 3    Attendees: Client attended alone    Service Modality:  Video Visit:      Provider verified identity through the following two step process.  Patient provided:  Patient  and Patient is known previously to provider    Telemedicine Visit: The patient's condition can be safely assessed and treated via synchronous audio and visual telemedicine encounter.      Reason for Telemedicine Visit: Patient convenience (e.g. access to timely appointments / distance to available provider)    Originating Site (Patient Location): Patient's home    Distant Site (Provider Location): Provider Remote Setting- Home Office    Consent:  The patient/guardian has verbally consented to: the potential risks and benefits of telemedicine (video visit) versus in person care; bill my insurance or make self-payment for services provided; and responsibility for payment of non-covered services.     Patient would like the video invitation sent by:  My Chart    Mode of Communication:  Video Conference via [facility]    Distant Location (Provider):  Off-site    As the provider I attest to compliance with applicable laws and regulations related to telemedicine.    DATA  Extended Session (53+ minutes):   - Extensive Content to Cover this session.  Interactive Complexity: No  Crisis: No     Progress Since Last Session (Related to Symptoms / Goals / Homework):   Symptoms: Improving Pt stated some improvement- no recent panic attacks.     Homework: Partially completed        Episode of Care Goals: Minimal progress - ACTION (Actively working towards change); Intervened by reinforcing change plan / affirming steps taken     Current / Ongoing Stressors and Concerns:  Pt shared how the relationship  "with her sister can be stressful and \"dramatic.\" Pt explained her sister gets jealous about pt spending time with her cousin. Pt stated a big family gathering was coming up and was somewhat stress about possible family drama. Pt discussed how her grandmother and other family members take her support for granted. Pt processed how she feels about being seen in a negative light by family members.      Treatment Objective(s) Addressed in This Session:   compile a list of boundaries that they would like to set with others. And assertive communication.     Intervention:   Interpersonal Therapy: Boundaries and assertive communication     Assessments completed prior to visit:  The following assessments were completed by patient for this visit:  PHQ9:       3/20/2023     2:36 AM 6/20/2023    11:46 AM 6/28/2023     1:00 AM 7/10/2023     1:03 AM 7/19/2023    11:33 AM   PHQ-9 SCORE   PHQ-9 Total Score MyChart 23 (Severe depression) 14 (Moderate depression) 16 (Moderately severe depression) 13 (Moderate depression) 15 (Moderately severe depression)   PHQ-9 Total Score 23 14 16 13 15     GAD7:       3/20/2023     2:42 AM 6/20/2023    12:44 AM 7/10/2023     1:04 AM 7/24/2023    10:25 AM   BRAN-7 SCORE   Total Score 21 (severe anxiety) 14 (moderate anxiety) 14 (moderate anxiety) 15 (severe anxiety)   Total Score 21 14 14 15     CAGE-AID:       6/28/2023     1:19 AM   CAGE-AID Total Score   Total Score 4   Total Score MyChart 4 (A total score of 2 or greater is considered clinically significant)     PROMIS 10-Global Health (all questions and answers displayed):       6/28/2023     1:18 AM 7/10/2023     1:05 AM 7/24/2023    10:26 AM   PROMIS 10   In general, would you say your health is: Fair Good Good   In general, would you say your quality of life is: Fair Fair Good   In general, how would you rate your physical health? Fair Good Good   In general, how would you rate your mental health, including your mood and your ability to " think? Fair Good Good   In general, how would you rate your satisfaction with your social activities and relationships? Good Excellent Very good   In general, please rate how well you carry out your usual social activities and roles Very good Excellent Good   To what extent are you able to carry out your everyday physical activities such as walking, climbing stairs, carrying groceries, or moving a chair? Completely Completely Completely   In the past 7 days, how often have you been bothered by emotional problems such as feeling anxious, depressed, or irritable? Often Often Often   In the past 7 days, how would you rate your fatigue on average? Severe Moderate Moderate   In the past 7 days, how would you rate your pain on average, where 0 means no pain, and 10 means worst imaginable pain? 0 1 1   In general, would you say your health is: 2 3 3   In general, would you say your quality of life is: 2 2 3   In general, how would you rate your physical health? 2 3 3   In general, how would you rate your mental health, including your mood and your ability to think? 2 3 3   In general, how would you rate your satisfaction with your social activities and relationships? 3 5 4   In general, please rate how well you carry out your usual social activities and roles. (This includes activities at home, at work and in your community, and responsibilities as a parent, child, spouse, employee, friend, etc.) 4 5 3   To what extent are you able to carry out your everyday physical activities such as walking, climbing stairs, carrying groceries, or moving a chair? 5 5 5   In the past 7 days, how often have you been bothered by emotional problems such as feeling anxious, depressed, or irritable? 4 4 4   In the past 7 days, how would you rate your fatigue on average? 4 3 3   In the past 7 days, how would you rate your pain on average, where 0 means no pain, and 10 means worst imaginable pain? 0 1 1   Global Mental Health Score 9 12 12    Global Physical Health Score 14 15 15   PROMIS TOTAL - SUBSCORES 23 27 27     [place] Suicide Severity Rating Scale (Lifetime/Recent)      6/28/2023     3:29 PM   [place] Suicide Severity Rating (Lifetime/Recent)   Q1 Wish to be Dead (Lifetime) Y   1. Wish to be Dead (Past 1 Month) Y   Wish to be Dead Description (Past 1 Month) passive SI chronic   Q2 Non-Specific Active Suicidal Thoughts (Lifetime) Y   Non-Specific Active Suicidal Thought Description (Lifetime) head banging   2. Non-Specific Active Suicidal Thoughts (Past 1 Month) N   Calculated C-SSRS Risk Score (Lifetime/Recent) Low Risk         ASSESSMENT: Current Emotional / Mental Status (status of significant symptoms):   Risk status (Self / Other harm or suicidal ideation)   Patient denies current fears or concerns for personal safety.   Patient denies current or recent suicidal ideation or behaviors.   Patient denies current or recent homicidal ideation or behaviors.   Patient denies current or recent self injurious behavior or ideation.   Patient denies other safety concerns.   Patient reports there has been no change in risk factors since their last session.     Patient reports there has been no change in protective factors since their last session.     Recommended that patient call 911 or go to the local ED should there be a change in any of these risk factors.     Appearance:   Appropriate    Eye Contact:   Good    Psychomotor Behavior: Normal    Attitude:   Cooperative    Orientation:   All   Speech    Rate / Production: Normal/ Responsive    Volume:  Normal    Mood:    Normal   Affect:    Appropriate    Thought Content:  Clear    Thought Form:  Coherent    Insight:    Fair      Medication Review:   No changes to current psychiatric medication(s)     Medication Compliance:   Yes     Changes in Health Issues:   None reported     Chemical Use Review:   Substance Use: Chemical use reviewed, no active concerns identified    Tobacco Use: No current  tobacco use.      Diagnosis:  1. BRAN (generalized anxiety disorder)    2. Moderate episode of recurrent major depressive disorder (H)    3. Trauma and stressor-related disorder      Collateral Reports Completed:   Not Applicable    PLAN: (Patient Tasks / Therapist Tasks / Other)  Pt to continue to engage in mindfulness activities & deep breathing exercises.   Therapist will continue interpersonal skills with pt.     June Lennon, [facility] 8/7/23   Note reviewed and clinical supervision by NATE Yung, [facility] 8/22/2023       _____________________________________________________________    Individual Treatment Plan    Patient's Name: Cyndie Mae  YOB: 2005    Date of Creation: 7/19/23  Date Treatment Plan Last Reviewed/Revised: 7/24/23    DSM5 Diagnoses: 296.32 (F33.1) Major Depressive Disorder, Recurrent Episode, Moderate _ and With anxious distress, 300.02 (F41.1) Generalized Anxiety Disorder, or 309.9 (F43.9) Unspecified Trauma and Stressor Related Disorder  Psychosocial / Contextual Factors: Pt lives with grandparents. Pt has a complex family unit dynamics.   PROMIS (reviewed every 90 days): 10/24/23    Referral / Collaboration:  Referral to another professional/service is not indicated at this time.    Anticipated number of session for this episode of care: 9-12 sessions  Anticipation frequency of session: Biweekly  Anticipated Duration of each session: 38-52 minutes  Treatment plan will be reviewed in 90 days or when goals have been changed.     MeasurableTreatment Goal(s) related to diagnosis / functional impairment(s)  Goal 1: Patient will experience a reduction of the intensity/duration of anxiety and its occurrence. Impacts of anxiety (e.g.Vomiting) will subside.      I will know I've met my goal when I don't have panic attacks.      Objective #A (Patient Action)    Patient will will experience a reduction in anxiety, will develop   more effective coping skills to  manage anxiety symptoms, will develop healthy cognitive patterns and beliefs and will increase ability to function adaptively              Client will use cognitive strategies identified in therapy to challenge anxious thoughts.  Client will use relaxation strategies many times per day to reduce the physical symptoms of anxiety.    Status: Continued - Date(s): 7/24/23    Intervention(s)  Therapist will teach  CBT skills to challenge intrusive thoughts  .Therapist will teach pt Mindfulness skills.    Objective #B  Patient will address relationship difficulties in a more adaptive manner. Patient will examine relationship hx and learn skills to more effectively communicate and be assertive.   Status: Continued - Date(s): 7/24/23    Intervention(s)  Therapist will teach assertiveness skills. Therapist will help pt set healthy boundaries .    Objective #C  Patient will identify negative self-talk and behaviors: challenge core beliefs, myths, and actions. Decrease frequency and intensity of feeling down, depressed, hopeless.    Status: Continued - Date(s): 7/24/23     Intervention(s)  Therapist will help pt identify negative core belief and cognitive distortions. Therapist will help pt to develop healthy cognitive patterns and beliefs.        Patient has reviewed and agreed to the above plan.      June Lennon, [facility]  July 24, 2023  Treatment plan reviewed and clinical supervision by NATE Yung, [facility] 8/3/2023

## 2023-08-21 ENCOUNTER — VIRTUAL VISIT (OUTPATIENT)
Dept: PSYCHOLOGY | Facility: CLINIC | Age: 18
End: 2023-08-21
Payer: COMMERCIAL

## 2023-08-21 DIAGNOSIS — F43.9 TRAUMA AND STRESSOR-RELATED DISORDER: ICD-10-CM

## 2023-08-21 DIAGNOSIS — F33.1 MODERATE EPISODE OF RECURRENT MAJOR DEPRESSIVE DISORDER (H): ICD-10-CM

## 2023-08-21 DIAGNOSIS — F41.1 GAD (GENERALIZED ANXIETY DISORDER): Primary | ICD-10-CM

## 2023-08-21 PROCEDURE — 90837 PSYTX W PT 60 MINUTES: CPT | Mod: VID

## 2023-09-05 ENCOUNTER — VIRTUAL VISIT (OUTPATIENT)
Dept: PSYCHOLOGY | Facility: CLINIC | Age: 18
End: 2023-09-05
Payer: COMMERCIAL

## 2023-09-05 DIAGNOSIS — F33.1 MODERATE EPISODE OF RECURRENT MAJOR DEPRESSIVE DISORDER (H): ICD-10-CM

## 2023-09-05 DIAGNOSIS — F43.9 TRAUMA AND STRESSOR-RELATED DISORDER: ICD-10-CM

## 2023-09-05 DIAGNOSIS — F41.1 GAD (GENERALIZED ANXIETY DISORDER): Primary | ICD-10-CM

## 2023-09-05 PROCEDURE — 90837 PSYTX W PT 60 MINUTES: CPT | Mod: VID

## 2023-09-05 NOTE — PROGRESS NOTES
[facility]                                     Progress Note    Patient Name: Cyndie Mae  Date: 8/21/23       Service Type: Individual      Session Start Time: 11:01 am Session End Time: 11:57 am      Session Length: 56 min    Session #: 4    Attendees: Client attended alone    Service Modality:  Video Visit:      Provider verified identity through the following two step process.  Patient provided:  Patient is known previously to provider    Telemedicine Visit: The patient's condition can be safely assessed and treated via synchronous audio and visual telemedicine encounter.      Reason for Telemedicine Visit: Patient convenience (e.g. access to timely appointments / distance to available provider)    Originating Site (Patient Location): Patient's home    Distant Site (Provider Location): Provider Remote Setting- Home Office    Consent:  The patient/guardian has verbally consented to: the potential risks and benefits of telemedicine (video visit) versus in person care; bill my insurance or make self-payment for services provided; and responsibility for payment of non-covered services.     Patient would like the video invitation sent by:  My Chart    Mode of Communication:  Video Conference [facility]    Distant Location (Provider):  Off-site    As the provider I attest to compliance with applicable laws and regulations related to telemedicine.    DATA  Extended Session (53+ minutes):   - Patient's presenting concerns require more intensive intervention than could be completed within the usual service  Interactive Complexity: No  Crisis: No     Progress Since Last Session (Related to Symptoms / Goals / Homework):   Symptoms: No change Pt reported having a few stressful weeks.    Homework: Achieved / completed to satisfaction        Episode of Care Goals: Minimal progress - ACTION (Actively working towards change); Intervened by reinforcing change plan / affirming steps taken     Current  / Ongoing Stressors and Concerns:  Pt shared a change in appetite and eating less. Pt stated she was going to get COVID tested as her grandmother tested positive. Pt discussed how her family's [place] ceremony went. Pt reported a panic attack during celebration. Pt processed how lack of communication in the family affects her.      Treatment Objective(s) Addressed in This Session:   use relaxation strategies 3-4 times per day to reduce the physical symptoms of anxiety     Intervention:   Motivational Interviewing    MI Intervention: Expressed Empathy/Understanding, Supported Autonomy, Collaboration, Evocation, Open-ended questions, Reflections: simple and complex, and Rolled with resistance: Shifted topic to defuse resistance     Change Talk Expressed by the Patient: Desire to change    Provider Response to Change Talk: E - Evoked more info from patient about behavior change, A - Affirmed patient's thoughts, decisions, or attempts at behavior change, and R - Reflected patient's change talk    Mindfulness Skills: Grounding Skills.     Assessments completed prior to visit:  The following assessments were completed by patient for this visit:  PHQ9:       3/20/2023     2:36 AM 6/20/2023    11:46 AM 6/28/2023     1:00 AM 7/10/2023     1:03 AM 7/19/2023    11:33 AM   PHQ-9 SCORE   PHQ-9 Total Score MyChart 23 (Severe depression) 14 (Moderate depression) 16 (Moderately severe depression) 13 (Moderate depression) 15 (Moderately severe depression)   PHQ-9 Total Score 23 14 16 13 15     GAD7:       3/20/2023     2:42 AM 6/20/2023    12:44 AM 7/10/2023     1:04 AM 7/24/2023    10:25 AM   BRAN-7 SCORE   Total Score 21 (severe anxiety) 14 (moderate anxiety) 14 (moderate anxiety) 15 (severe anxiety)   Total Score 21 14 14 15     CAGE-AID:       6/28/2023     1:19 AM   CAGE-AID Total Score   Total Score 4   Total Score MyChart 4 (A total score of 2 or greater is considered clinically significant)     PROMIS 10-Global Health (all  questions and answers displayed):       6/28/2023     1:18 AM 7/10/2023     1:05 AM 7/24/2023    10:26 AM   PROMIS 10   In general, would you say your health is: Fair Good Good   In general, would you say your quality of life is: Fair Fair Good   In general, how would you rate your physical health? Fair Good Good   In general, how would you rate your mental health, including your mood and your ability to think? Fair Good Good   In general, how would you rate your satisfaction with your social activities and relationships? Good Excellent Very good   In general, please rate how well you carry out your usual social activities and roles Very good Excellent Good   To what extent are you able to carry out your everyday physical activities such as walking, climbing stairs, carrying groceries, or moving a chair? Completely Completely Completely   In the past 7 days, how often have you been bothered by emotional problems such as feeling anxious, depressed, or irritable? Often Often Often   In the past 7 days, how would you rate your fatigue on average? Severe Moderate Moderate   In the past 7 days, how would you rate your pain on average, where 0 means no pain, and 10 means worst imaginable pain? 0 1 1   In general, would you say your health is: 2 3 3   In general, would you say your quality of life is: 2 2 3   In general, how would you rate your physical health? 2 3 3   In general, how would you rate your mental health, including your mood and your ability to think? 2 3 3   In general, how would you rate your satisfaction with your social activities and relationships? 3 5 4   In general, please rate how well you carry out your usual social activities and roles. (This includes activities at home, at work and in your community, and responsibilities as a parent, child, spouse, employee, friend, etc.) 4 5 3   To what extent are you able to carry out your everyday physical activities such as walking, climbing stairs, carrying  groceries, or moving a chair? 5 5 5   In the past 7 days, how often have you been bothered by emotional problems such as feeling anxious, depressed, or irritable? 4 4 4   In the past 7 days, how would you rate your fatigue on average? 4 3 3   In the past 7 days, how would you rate your pain on average, where 0 means no pain, and 10 means worst imaginable pain? 0 1 1   Global Mental Health Score 9 12 12   Global Physical Health Score 14 15 15   PROMIS TOTAL - SUBSCORES 23 27 27     [place] Suicide Severity Rating Scale (Lifetime/Recent)      6/28/2023     3:29 PM   [place] Suicide Severity Rating (Lifetime/Recent)   Q1 Wish to be Dead (Lifetime) Y   1. Wish to be Dead (Past 1 Month) Y   Wish to be Dead Description (Past 1 Month) passive SI chronic   Q2 Non-Specific Active Suicidal Thoughts (Lifetime) Y   Non-Specific Active Suicidal Thought Description (Lifetime) head banging   2. Non-Specific Active Suicidal Thoughts (Past 1 Month) N   Calculated C-SSRS Risk Score (Lifetime/Recent) Low Risk         ASSESSMENT: Current Emotional / Mental Status (status of significant symptoms):   Risk status (Self / Other harm or suicidal ideation)   Patient denies current fears or concerns for personal safety.   Patient denies current or recent suicidal ideation or behaviors.   Patient denies current or recent homicidal ideation or behaviors.   Patient denies current or recent self injurious behavior or ideation.   Patient denies other safety concerns.   Patient reports there has been no change in risk factors since their last session.     Patient reports there has been no change in protective factors since their last session.     Recommended that patient call 911 or go to the local ED should there be a change in any of these risk factors.     Appearance:   Appropriate    Eye Contact:   Good    Psychomotor Behavior: Normal    Attitude:   Cooperative    Orientation:   All   Speech    Rate / Production: Normal/  Responsive    Volume:  Normal    Mood:    Normal   Affect:    Appropriate    Thought Content:  Clear    Thought Form:  Coherent    Insight:    Fair      Medication Review:   No changes to current psychiatric medication(s)     Medication Compliance:   Yes     Changes in Health Issues:   None reported     Chemical Use Review:   Substance Use: Chemical use reviewed, no active concerns identified    Tobacco Use: No current tobacco use.      Diagnosis:  1. BRAN (generalized anxiety disorder)    2. Moderate episode of recurrent major depressive disorder (H)    3. Trauma and stressor-related disorder      Collateral Reports Completed:   Not Applicable    PLAN: (Patient Tasks / Therapist Tasks / Other)  Pt to continue to engage in mindfulness skills to reduce physical symptoms of anxiety.   Therapist will continue with CBT Skills.     June Lennon, [facility] 8/21/23   Note reviewed and clinical supervision by NATE Yung, [facility] 9/12/2023       _____________________________________________________________    Individual Treatment Plan    Patient's Name: Cyndie Mae  YOB: 2005    Date of Creation: 7/19/23  Date Treatment Plan Last Reviewed/Revised: 7/24/23    DSM5 Diagnoses: 296.32 (F33.1) Major Depressive Disorder, Recurrent Episode, Moderate _ and With anxious distress, 300.02 (F41.1) Generalized Anxiety Disorder, or 309.9 (F43.9) Unspecified Trauma and Stressor Related Disorder  Psychosocial / Contextual Factors: Pt lives with grandparents. Pt has a complex family unit dynamics.   PROMIS (reviewed every 90 days): 10/24/23    Referral / Collaboration:  Referral to another professional/service is not indicated at this time.    Anticipated number of session for this episode of care: 9-12 sessions  Anticipation frequency of session: Biweekly  Anticipated Duration of each session: 38-52 minutes  Treatment plan will be reviewed in 90 days or when goals have been changed.      MeasurableTreatment Goal(s) related to diagnosis / functional impairment(s)  Goal 1: Patient will experience a reduction of the intensity/duration of anxiety and its occurrence. Impacts of anxiety (e.g.Vomiting) will subside.      I will know I've met my goal when I don't have panic attacks.      Objective #A (Patient Action)    Patient will will experience a reduction in anxiety, will develop   more effective coping skills to manage anxiety symptoms, will develop healthy cognitive patterns and beliefs and will increase ability to function adaptively              Client will use cognitive strategies identified in therapy to challenge anxious thoughts.  Client will use relaxation strategies many times per day to reduce the physical symptoms of anxiety.    Status: Continued - Date(s): 7/24/23    Intervention(s)  Therapist will teach  CBT skills to challenge intrusive thoughts  .Therapist will teach pt Mindfulness skills.    Objective #B  Patient will address relationship difficulties in a more adaptive manner. Patient will examine relationship hx and learn skills to more effectively communicate and be assertive.   Status: Continued - Date(s): 7/24/23    Intervention(s)  Therapist will teach assertiveness skills. Therapist will help pt set healthy boundaries .    Objective #C  Patient will identify negative self-talk and behaviors: challenge core beliefs, myths, and actions. Decrease frequency and intensity of feeling down, depressed, hopeless.    Status: Continued - Date(s): 7/24/23     Intervention(s)  Therapist will help pt identify negative core belief and cognitive distortions. Therapist will help pt to develop healthy cognitive patterns and beliefs.        Patient has reviewed and agreed to the above plan.      June Lennon, [facility]  July 24, 2023  Treatment plan reviewed and clinical supervision by NATE Yung, [facility] 8/3/2023

## 2023-09-19 ENCOUNTER — VIRTUAL VISIT (OUTPATIENT)
Dept: PSYCHOLOGY | Facility: CLINIC | Age: 18
End: 2023-09-19
Payer: COMMERCIAL

## 2023-09-19 DIAGNOSIS — F43.9 TRAUMA AND STRESSOR-RELATED DISORDER: ICD-10-CM

## 2023-09-19 DIAGNOSIS — F33.1 MODERATE EPISODE OF RECURRENT MAJOR DEPRESSIVE DISORDER (H): ICD-10-CM

## 2023-09-19 DIAGNOSIS — F41.1 GAD (GENERALIZED ANXIETY DISORDER): Primary | ICD-10-CM

## 2023-09-19 PROCEDURE — 90837 PSYTX W PT 60 MINUTES: CPT | Mod: VID

## 2023-09-20 ENCOUNTER — VIRTUAL VISIT (OUTPATIENT)
Dept: FAMILY MEDICINE | Facility: CLINIC | Age: 18
End: 2023-09-20
Payer: COMMERCIAL

## 2023-09-20 DIAGNOSIS — F41.9 ANXIETY AND DEPRESSION: ICD-10-CM

## 2023-09-20 DIAGNOSIS — F32.A ANXIETY AND DEPRESSION: ICD-10-CM

## 2023-09-20 PROCEDURE — 99213 OFFICE O/P EST LOW 20 MIN: CPT | Mod: VID | Performed by: PHYSICIAN ASSISTANT

## 2023-09-20 RX ORDER — SERTRALINE HYDROCHLORIDE 100 MG/1
150 TABLET, FILM COATED ORAL DAILY
Qty: 135 TABLET | Refills: 1 | Status: SHIPPED | OUTPATIENT
Start: 2023-09-20 | End: 2024-07-02 | Stop reason: SINTOL

## 2023-09-20 ASSESSMENT — ANXIETY QUESTIONNAIRES
7. FEELING AFRAID AS IF SOMETHING AWFUL MIGHT HAPPEN: SEVERAL DAYS
IF YOU CHECKED OFF ANY PROBLEMS ON THIS QUESTIONNAIRE, HOW DIFFICULT HAVE THESE PROBLEMS MADE IT FOR YOU TO DO YOUR WORK, TAKE CARE OF THINGS AT HOME, OR GET ALONG WITH OTHER PEOPLE: SOMEWHAT DIFFICULT
5. BEING SO RESTLESS THAT IT IS HARD TO SIT STILL: NEARLY EVERY DAY
1. FEELING NERVOUS, ANXIOUS, OR ON EDGE: MORE THAN HALF THE DAYS
2. NOT BEING ABLE TO STOP OR CONTROL WORRYING: NEARLY EVERY DAY
GAD7 TOTAL SCORE: 16
6. BECOMING EASILY ANNOYED OR IRRITABLE: SEVERAL DAYS
GAD7 TOTAL SCORE: 16
4. TROUBLE RELAXING: NEARLY EVERY DAY
3. WORRYING TOO MUCH ABOUT DIFFERENT THINGS: NEARLY EVERY DAY

## 2023-09-20 NOTE — PROGRESS NOTES
"Cyndie is a 17 year old who is being evaluated via a billable video visit.      How would you like to obtain your AVS? MyChart  If the video visit is dropped, the invitation should be resent by: Send to e-mail at: [email or web address]).[employer] App  Will anyone else be joining your video visit? No          Assessment & Plan     Anxiety and depression  Continue on the 150  mg dose of the sertraline.   Refill x 6 months.   Continue counseling.   Plan for a recheck in the office or video visit in 6 months.   - sertraline (ZOLOFT) 100 MG tablet; Take 1.5 tablets (150 mg) by mouth daily             BMI:   Estimated body mass index is 30.84 kg/m  as calculated from the following:    Height as of 6/20/23: 1.645 m (5' 4.76\").    Weight as of 6/20/23: 83.5 kg (184 lb).   Weight management plan: not discussed        Kristen M. Kehr, PA-C  [facility]    Subjective   Cyndie is a 17 year old, presenting for the following health issues:   Follow Up      9/20/2023     7:51 AM   Additional Questions   Roomed by Jluis       History of Present Illness       Mental Health Follow-up:  Patient presents to follow-up on Depression & Anxiety.Patient's depression since last visit has been:  Better  The patient is not having other symptoms associated with depression.  Patient's anxiety since last visit has been:  Better  The patient is having other symptoms associated with anxiety.  Any significant life events: No  Patient is not feeling anxious or having panic attacks.  Patient has no concerns about alcohol or drug use.    She eats 2-3 servings of fruits and vegetables daily.She consumes 0 sweetened beverage(s) daily.She exercises with enough effort to increase her heart rate 9 or less minutes per day.  She exercises with enough effort to increase her heart rate 3 or less days per week.   She is taking medications regularly.       She feels she is doing well with the sertraline 150  mg daily.   She is working with counseling " every 2 weeks.   Recently changed her major again and tried to transfer to the [employer].   She was unable to transfer due to being a few credits / classes short. She now plans to work for a short time and determine if this transfer is something she is going to do. She is applying for jobs.           Review of Systems   Constitutional, HEENT, cardiovascular, pulmonary, GI, , musculoskeletal, neuro, skin, endocrine and psych systems are negative, except as otherwise noted.      Objective           Vitals:  No vitals were obtained today due to virtual visit.    Physical Exam   GENERAL: Healthy, alert and no distress  EYES: Eyes grossly normal to inspection.  No discharge or erythema, or obvious scleral/conjunctival abnormalities.  RESP: No audible wheeze, cough, or visible cyanosis.  No visible retractions or increased work of breathing.    SKIN: Visible skin clear. No significant rash, abnormal pigmentation or lesions.  NEURO: Cranial nerves grossly intact.  Mentation and speech appropriate for age.  PSYCH: Mentation appears normal, affect normal/bright, judgement and insight intact, normal speech and appearance well-groomed.                Video-Visit Details    Type of service:  Video Visit   Video Start Time:  10:50 AM  Video End Time: 11:05 AM    Originating Location (pt. Location): Home    Distant Location (provider location):  On-site  Platform used for Video Visit: Lorenzo

## 2023-09-20 NOTE — PATIENT INSTRUCTIONS
Refills of your medication for sertraline 150  mg daily sent to the pharmacy    Please schedule a follow up in 6 months / video visit

## 2023-09-21 NOTE — PROGRESS NOTES
[facility]                                     Progress Note    Patient Name: Cyndie Mae  Date: 9/5/23       Service Type: Individual      Session Start Time: 11:00 am Session End Time: 11:56 am      Session Length: 56 min    Session #: 5    Attendees: Client attended alone    Service Modality:  Video Visit:      Provider verified identity through the following two step process.  Patient provided:  Patient is known previously to provider    Telemedicine Visit: The patient's condition can be safely assessed and treated via synchronous audio and visual telemedicine encounter.      Reason for Telemedicine Visit: Patient convenience (e.g. access to timely appointments / distance to available provider)    Originating Site (Patient Location): Patient's home    Distant Site (Provider Location): Provider Remote Setting- Home Office    Consent:  The patient/guardian has verbally consented to: the potential risks and benefits of telemedicine (video visit) versus in person care; bill my insurance or make self-payment for services provided; and responsibility for payment of non-covered services.     Patient would like the video invitation sent by:  My Chart    Mode of [facility]:  [facility]    Distant Location (Provider):  Off-site    As the provider I attest to compliance with applicable laws and regulations related to telemedicine.    DATA  Extended Session (53+ minutes):   - Extensive content to cover in session.  Interactive Complexity: No  Crisis: No     Progress Since Last Session (Related to Symptoms / Goals / Homework):   Symptoms: No change Symptoms remain the same.    Homework: Achieved / completed to satisfaction        Episode of Care Goals: Minimal progress - ACTION (Actively working towards change); Intervened by reinforcing change plan / affirming steps taken     Current / Ongoing Stressors and Concerns:  Pt discussed miscommunications with family members. Pt  discussed anxious feelings when family make assumptions about her. Pt explained mother moved out to [place] and left many things for her to do like moving out furniture and cleaning the house. Pt stated only sleeping 4-6 hours every day.       Treatment Objective(s) Addressed in This Session:   use cognitive strategies identified in therapy to challenge anxious thoughts     Intervention:   Motivational Interviewing    MI Intervention: Expressed Empathy/Understanding, Supported Autonomy, Collaboration, Evocation, and Reflections: simple and complex     Change Talk Expressed by the Patient: Desire to change    Provider Response to Change Talk: E - Evoked more info from patient about behavior change and A - Affirmed patient's thoughts, decisions, or attempts at behavior change    Mindfulness Skills: Grounding Skills.     Assessments completed prior to visit:  The following assessments were completed by patient for this visit:  PHQ9:       3/20/2023     2:36 AM 6/20/2023    11:46 AM 6/28/2023     1:00 AM 7/10/2023     1:03 AM 7/19/2023    11:33 AM   PHQ-9 SCORE   PHQ-9 Total Score MyChart 23 (Severe depression) 14 (Moderate depression) 16 (Moderately severe depression) 13 (Moderate depression) 15 (Moderately severe depression)   PHQ-9 Total Score 23 14 16 13 15     GAD7:       3/20/2023     2:42 AM 6/20/2023    12:44 AM 7/10/2023     1:04 AM 7/24/2023    10:25 AM 9/20/2023     7:51 AM   BRAN-7 SCORE   Total Score 21 (severe anxiety) 14 (moderate anxiety) 14 (moderate anxiety) 15 (severe anxiety) 16 (severe anxiety)   Total Score 21 14 14 15 16     CAGE-AID:       6/28/2023     1:19 AM   CAGE-AID Total Score   Total Score 4   Total Score MyChart 4 (A total score of 2 or greater is considered clinically significant)     PROMIS 10-Global Health (all questions and answers displayed):       6/28/2023     1:18 AM 7/10/2023     1:05 AM 7/24/2023    10:26 AM   PROMIS 10   In general, would you say your health is: Fair Good  Good   In general, would you say your quality of life is: Fair Fair Good   In general, how would you rate your physical health? Fair Good Good   In general, how would you rate your mental health, including your mood and your ability to think? Fair Good Good   In general, how would you rate your satisfaction with your social activities and relationships? Good Excellent Very good   In general, please rate how well you carry out your usual social activities and roles Very good Excellent Good   To what extent are you able to carry out your everyday physical activities such as walking, climbing stairs, carrying groceries, or moving a chair? Completely Completely Completely   In the past 7 days, how often have you been bothered by emotional problems such as feeling anxious, depressed, or irritable? Often Often Often   In the past 7 days, how would you rate your fatigue on average? Severe Moderate Moderate   In the past 7 days, how would you rate your pain on average, where 0 means no pain, and 10 means worst imaginable pain? 0 1 1   In general, would you say your health is: 2 3 3   In general, would you say your quality of life is: 2 2 3   In general, how would you rate your physical health? 2 3 3   In general, how would you rate your mental health, including your mood and your ability to think? 2 3 3   In general, how would you rate your satisfaction with your social activities and relationships? 3 5 4   In general, please rate how well you carry out your usual social activities and roles. (This includes activities at home, at work and in your community, and responsibilities as a parent, child, spouse, employee, friend, etc.) 4 5 3   To what extent are you able to carry out your everyday physical activities such as walking, climbing stairs, carrying groceries, or moving a chair? 5 5 5   In the past 7 days, how often have you been bothered by emotional problems such as feeling anxious, depressed, or irritable? 4 4 4   In  the past 7 days, how would you rate your fatigue on average? 4 3 3   In the past 7 days, how would you rate your pain on average, where 0 means no pain, and 10 means worst imaginable pain? 0 1 1   Global Mental Health Score 9 12 12   Global Physical Health Score 14 15 15   PROMIS TOTAL - SUBSCORES 23 27 27     [place] Suicide Severity Rating Scale (Lifetime/Recent)      6/28/2023     3:29 PM   [place] Suicide Severity Rating (Lifetime/Recent)   Q1 Wish to be Dead (Lifetime) Y   1. Wish to be Dead (Past 1 Month) Y   Wish to be Dead Description (Past 1 Month) passive SI chronic   Q2 Non-Specific Active Suicidal Thoughts (Lifetime) Y   Non-Specific Active Suicidal Thought Description (Lifetime) head banging   2. Non-Specific Active Suicidal Thoughts (Past 1 Month) N   Calculated C-SSRS Risk Score (Lifetime/Recent) Low Risk         ASSESSMENT: Current Emotional / Mental Status (status of significant symptoms):   Risk status (Self / Other harm or suicidal ideation)   Patient denies current fears or concerns for personal safety.   Patient denies current or recent suicidal ideation or behaviors.   Patient denies current or recent homicidal ideation or behaviors.   Patient denies current or recent self injurious behavior or ideation.   Patient denies other safety concerns.   Patient reports there has been no change in risk factors since their last session.     Patient reports there has been no change in protective factors since their last session.     Recommended that patient call 911 or go to the local ED should there be a change in any of these risk factors.     Appearance:   Appropriate    Eye Contact:   Good    Psychomotor Behavior: Normal    Attitude:   Cooperative    Orientation:   All   Speech    Rate / Production: Normal/ Responsive    Volume:  Normal    Mood:    Normal   Affect:    Appropriate    Thought Content:  Clear    Thought Form:  Coherent    Insight:    Fair      Medication Review:   No changes to current  psychiatric medication(s)     Medication Compliance:   Yes     Changes in Health Issues:   None reported     Chemical Use Review:   Substance Use: Chemical use reviewed, no active concerns identified    Tobacco Use: No current tobacco use.      Diagnosis:  1. BRAN (generalized anxiety disorder)    2. Moderate episode of recurrent major depressive disorder (H)    3. Trauma and stressor-related disorder      Collateral Reports Completed:   Not Applicable    PLAN: (Patient Tasks / Therapist Tasks / Other)  Pt to continue using CBT Skills.   Therapist will address communication skills with pt.     June Lennon, [facility] 9/5/23   Note reviewed and clinical supervision by NATE Yung, [facility] 9/26/2023     _____________________________________________________________    Individual Treatment Plan    Patient's Name: Cyndie Mae  YOB: 2005    Date of Creation: 7/19/23  Date Treatment Plan Last Reviewed/Revised: 7/24/23    DSM5 Diagnoses: 296.32 (F33.1) Major Depressive Disorder, Recurrent Episode, Moderate _ and With anxious distress, 300.02 (F41.1) Generalized Anxiety Disorder, or 309.9 (F43.9) Unspecified Trauma and Stressor Related Disorder  Psychosocial / Contextual Factors: Pt lives with grandparents. Pt has a complex family unit dynamics.   PROMIS (reviewed every 90 days): 10/24/23    Referral / Collaboration:  Referral to another professional/service is not indicated at this time.    Anticipated number of session for this episode of care: 9-12 sessions  Anticipation frequency of session: Biweekly  Anticipated Duration of each session: 38-52 minutes  Treatment plan will be reviewed in 90 days or when goals have been changed.     MeasurableTreatment Goal(s) related to diagnosis / functional impairment(s)  Goal 1: Patient will experience a reduction of the intensity/duration of anxiety and its occurrence. Impacts of anxiety (e.g.Vomiting) will subside.      I will know I've met my  goal when I don't have panic attacks.      Objective #A (Patient Action)    Patient will will experience a reduction in anxiety, will develop   more effective coping skills to manage anxiety symptoms, will develop healthy cognitive patterns and beliefs and will increase ability to function adaptively              Client will use cognitive strategies identified in therapy to challenge anxious thoughts.  Client will use relaxation strategies many times per day to reduce the physical symptoms of anxiety.    Status: Continued - Date(s): 7/24/23    Intervention(s)  Therapist will teach  CBT skills to challenge intrusive thoughts  .Therapist will teach pt Mindfulness skills.    Objective #B  Patient will address relationship difficulties in a more adaptive manner. Patient will examine relationship hx and learn skills to more effectively communicate and be assertive.   Status: Continued - Date(s): 7/24/23    Intervention(s)  Therapist will teach assertiveness skills. Therapist will help pt set healthy boundaries .    Objective #C  Patient will identify negative self-talk and behaviors: challenge core beliefs, myths, and actions. Decrease frequency and intensity of feeling down, depressed, hopeless.    Status: Continued - Date(s): 7/24/23     Intervention(s)  Therapist will help pt identify negative core belief and cognitive distortions. Therapist will help pt to develop healthy cognitive patterns and beliefs.        Patient has reviewed and agreed to the above plan.      June Lennon, [facility]  July 24, 2023  Treatment plan reviewed and clinical supervision by NATE Yung, [facility] 8/3/2023

## 2023-10-03 ENCOUNTER — VIRTUAL VISIT (OUTPATIENT)
Dept: PSYCHOLOGY | Facility: CLINIC | Age: 18
End: 2023-10-03
Payer: COMMERCIAL

## 2023-10-03 DIAGNOSIS — F41.1 GAD (GENERALIZED ANXIETY DISORDER): Primary | ICD-10-CM

## 2023-10-03 DIAGNOSIS — F33.1 MODERATE EPISODE OF RECURRENT MAJOR DEPRESSIVE DISORDER (H): ICD-10-CM

## 2023-10-03 DIAGNOSIS — F43.9 TRAUMA AND STRESSOR-RELATED DISORDER: ICD-10-CM

## 2023-10-03 PROCEDURE — 90837 PSYTX W PT 60 MINUTES: CPT | Mod: VID

## 2023-10-03 NOTE — PROGRESS NOTES
[facility]                                     Progress Note    Patient Name: Cyndie Mae  Date: 9/19/23       Service Type: Individual      Session Start Time: 11:00 am Session End Time: 11:55 am       Session Length: 55 min    Session #: 6    Attendees: Client attended alone    Service Modality: Phone Visit- ( issues with video).        Provider verified identity through the following two step process.  Patient provided:  Patient is known previously to provider    Telemedicine Visit: The patient's condition can be safely assessed and treated via synchronous audio and visual telemedicine encounter.      Reason for Telemedicine Visit: Patient convenience (e.g. access to timely appointments / distance to available provider)    Originating Site (Patient Location): Patient's home    Distant Site (Provider Location): Provider Remote Setting- Home Office    Consent:  The patient/guardian has verbally consented to: the potential risks and benefits of telemedicine (video visit) versus in person care; bill my insurance or make self-payment for services provided; and responsibility for payment of non-covered services.     Patient would like the video invitation sent by:  My Chart    Mode of Communication:  Video Conference via [facility]    Distant Location (Provider):  Off-site    As the provider I attest to compliance with applicable laws and regulations related to telemedicine.    DATA  Extended Session (53+ minutes):   - Extensive content to cover in session.  Interactive Complexity: No  Crisis: No     Progress Since Last Session (Related to Symptoms / Goals / Homework):   Symptoms: Improving Pt stated not having any panic attacks the past two weeks.    Homework: Achieved / completed to satisfaction        Episode of Care Goals: Satisfactory progress - ACTION (Actively working towards change); Intervened by reinforcing change plan / affirming steps taken     Current / Ongoing Stressors and  Concerns:  Pt processed relationship with uncle and grandparents. Pt shared all the chores and errands family members excepts her to do. Pt discussed the stress of being a caregiver to grandpa. Pt explained she is the only one in the family able to help since other family members have other responsibilities.       Treatment Objective(s) Addressed in This Session:   learn & utilize at least  2 assertive communication skills weekly     Intervention:     Interpersonal Skills: Interpersonal effectiveness skills. Assertive communication skills and setting boundaries.     Assessments completed prior to visit:  The following assessments were completed by patient for this visit:  PHQ9:       3/20/2023     2:36 AM 6/20/2023    11:46 AM 6/28/2023     1:00 AM 7/10/2023     1:03 AM 7/19/2023    11:33 AM   PHQ-9 SCORE   PHQ-9 Total Score MyChart 23 (Severe depression) 14 (Moderate depression) 16 (Moderately severe depression) 13 (Moderate depression) 15 (Moderately severe depression)   PHQ-9 Total Score 23 14 16 13 15     GAD7:       3/20/2023     2:42 AM 6/20/2023    12:44 AM 7/10/2023     1:04 AM 7/24/2023    10:25 AM 9/20/2023     7:51 AM   BRAN-7 SCORE   Total Score 21 (severe anxiety) 14 (moderate anxiety) 14 (moderate anxiety) 15 (severe anxiety) 16 (severe anxiety)   Total Score 21 14 14 15 16     CAGE-AID:       6/28/2023     1:19 AM   CAGE-AID Total Score   Total Score 4   Total Score MyChart 4 (A total score of 2 or greater is considered clinically significant)     PROMIS 10-Global Health (all questions and answers displayed):       6/28/2023     1:18 AM 7/10/2023     1:05 AM 7/24/2023    10:26 AM   PROMIS 10   In general, would you say your health is: Fair Good Good   In general, would you say your quality of life is: Fair Fair Good   In general, how would you rate your physical health? Fair Good Good   In general, how would you rate your mental health, including your mood and your ability to think? Fair Good Good   In  general, how would you rate your satisfaction with your social activities and relationships? Good Excellent Very good   In general, please rate how well you carry out your usual social activities and roles Very good Excellent Good   To what extent are you able to carry out your everyday physical activities such as walking, climbing stairs, carrying groceries, or moving a chair? Completely Completely Completely   In the past 7 days, how often have you been bothered by emotional problems such as feeling anxious, depressed, or irritable? Often Often Often   In the past 7 days, how would you rate your fatigue on average? Severe Moderate Moderate   In the past 7 days, how would you rate your pain on average, where 0 means no pain, and 10 means worst imaginable pain? 0 1 1   In general, would you say your health is: 2 3 3   In general, would you say your quality of life is: 2 2 3   In general, how would you rate your physical health? 2 3 3   In general, how would you rate your mental health, including your mood and your ability to think? 2 3 3   In general, how would you rate your satisfaction with your social activities and relationships? 3 5 4   In general, please rate how well you carry out your usual social activities and roles. (This includes activities at home, at work and in your community, and responsibilities as a parent, child, spouse, employee, friend, etc.) 4 5 3   To what extent are you able to carry out your everyday physical activities such as walking, climbing stairs, carrying groceries, or moving a chair? 5 5 5   In the past 7 days, how often have you been bothered by emotional problems such as feeling anxious, depressed, or irritable? 4 4 4   In the past 7 days, how would you rate your fatigue on average? 4 3 3   In the past 7 days, how would you rate your pain on average, where 0 means no pain, and 10 means worst imaginable pain? 0 1 1   Global Mental Health Score 9 12 12   Global Physical Health Score  14 15 15   PROMIS TOTAL - SUBSCORES 23 27 27     [place] Suicide Severity Rating Scale (Lifetime/Recent)      6/28/2023     3:29 PM   [place] Suicide Severity Rating (Lifetime/Recent)   Q1 Wish to be Dead (Lifetime) Y   1. Wish to be Dead (Past 1 Month) Y   Wish to be Dead Description (Past 1 Month) passive SI chronic   Q2 Non-Specific Active Suicidal Thoughts (Lifetime) Y   Non-Specific Active Suicidal Thought Description (Lifetime) head banging   2. Non-Specific Active Suicidal Thoughts (Past 1 Month) N   Calculated C-SSRS Risk Score (Lifetime/Recent) Low Risk         ASSESSMENT: Current Emotional / Mental Status (status of significant symptoms):   Risk status (Self / Other harm or suicidal ideation)   Patient denies current fears or concerns for personal safety.   Patient denies current or recent suicidal ideation or behaviors.   Patient denies current or recent homicidal ideation or behaviors.   Patient denies current or recent self injurious behavior or ideation.   Patient denies other safety concerns.   Patient reports there has been no change in risk factors since their last session.     Patient reports there has been no change in protective factors since their last session.     Recommended that patient call 911 or go to the local ED should there be a change in any of these risk factors.     Appearance:   Appropriate    Eye Contact:   Good    Psychomotor Behavior: Normal    Attitude:   Cooperative    Orientation:   All   Speech    Rate / Production: Normal/ Responsive    Volume:  Normal    Mood:    Normal   Affect:    Appropriate    Thought Content:  Clear    Thought Form:  Coherent    Insight:    Fair      Medication Review:   No changes to current psychiatric medication(s)     Medication Compliance:   Yes     Changes in Health Issues:   None reported     Chemical Use Review:   Substance Use: Chemical use reviewed, no active concerns identified    Tobacco Use: No current tobacco use.      Diagnosis:  1.  BRAN (generalized anxiety disorder)    2. Moderate episode of recurrent major depressive disorder (H)    3. Trauma and stressor-related disorder      Collateral Reports Completed:   Not Applicable    PLAN: (Patient Tasks / Therapist Tasks / Other)  Pt to continue using assertive communication skills.   Therapist will address ACT skills with pt.       June Lennon, [facility] 9/19/23   Note reviewed and clinical supervision by NATE Yung, [facility] 10/5/2023       _____________________________________________________________    Individual Treatment Plan    Patient's Name: Cyndie Mae  YOB: 2005    Date of Creation: 7/19/23  Date Treatment Plan Last Reviewed/Revised: 7/24/23    DSM5 Diagnoses: 296.32 (F33.1) Major Depressive Disorder, Recurrent Episode, Moderate _ and With anxious distress, 300.02 (F41.1) Generalized Anxiety Disorder, or 309.9 (F43.9) Unspecified Trauma and Stressor Related Disorder  Psychosocial / Contextual Factors: Pt lives with grandparents. Pt has a complex family unit dynamics.   PROMIS (reviewed every 90 days): 10/24/23    Referral / Collaboration:  Referral to another professional/service is not indicated at this time.    Anticipated number of session for this episode of care: 9-12 sessions  Anticipation frequency of session: Biweekly  Anticipated Duration of each session: 38-52 minutes  Treatment plan will be reviewed in 90 days or when goals have been changed.     MeasurableTreatment Goal(s) related to diagnosis / functional impairment(s)  Goal 1: Patient will experience a reduction of the intensity/duration of anxiety and its occurrence. Impacts of anxiety (e.g.Vomiting) will subside.      I will know I've met my goal when I don't have panic attacks.      Objective #A (Patient Action)    Patient will will experience a reduction in anxiety, will develop   more effective coping skills to manage anxiety symptoms, will develop healthy cognitive patterns and  beliefs and will increase ability to function adaptively              Client will use cognitive strategies identified in therapy to challenge anxious thoughts.  Client will use relaxation strategies many times per day to reduce the physical symptoms of anxiety.    Status: Continued - Date(s): 7/24/23    Intervention(s)  Therapist will teach  CBT skills to challenge intrusive thoughts  .Therapist will teach pt Mindfulness skills.    Objective #B  Patient will address relationship difficulties in a more adaptive manner. Patient will examine relationship hx and learn skills to more effectively communicate and be assertive.   Status: Continued - Date(s): 7/24/23    Intervention(s)  Therapist will teach assertiveness skills. Therapist will help pt set healthy boundaries .    Objective #C  Patient will identify negative self-talk and behaviors: challenge core beliefs, myths, and actions. Decrease frequency and intensity of feeling down, depressed, hopeless.    Status: Continued - Date(s): 7/24/23     Intervention(s)  Therapist will help pt identify negative core belief and cognitive distortions. Therapist will help pt to develop healthy cognitive patterns and beliefs.        Patient has reviewed and agreed to the above plan.      June Lennon, [facility]  July 24, 2023  Treatment plan reviewed and clinical supervision by NATE Yung, [facility] 8/3/2023

## 2023-10-03 NOTE — PROGRESS NOTES
[facility]                                     Progress Note    Patient Name: Cyndie Mae  Date: 10/3/23       Service Type: Individual      Session Start Time: 11:00 am Session End Time: 11:56 am       Session Length: 56 min    Session #: 7    Attendees: Client attended alone    Service Modality:  Video Visit:      Provider verified identity through the following two step process.  Patient provided:  Patient is known previously to provider    Telemedicine Visit: The patient's condition can be safely assessed and treated via synchronous audio and visual telemedicine encounter.      Reason for Telemedicine Visit: Patient convenience (e.g. access to timely appointments / distance to available provider)    Originating Site (Patient Location): Patient's home    Distant Site (Provider Location): Provider Remote Setting- Home Office    Consent:  The patient/guardian has verbally consented to: the potential risks and benefits of telemedicine (video visit) versus in person care; bill my insurance or make self-payment for services provided; and responsibility for payment of non-covered services.     Patient would like the video invitation sent by:  My Chart    Mode of Communication:  Video Conference via [facility]    Distant Location (Provider):  Off-site    As the provider I attest to compliance with applicable laws and regulations related to telemedicine.    DATA  Extended Session (53+ minutes):   - Extensive content to cover in session.  Interactive Complexity: No  Crisis: No     Progress Since Last Session (Related to Symptoms / Goals / Homework):   Symptoms: No change Symptoms remain the same.    Homework: Achieved / completed to satisfaction        Episode of Care Goals: Minimal progress - ACTION (Actively working towards change); Intervened by reinforcing change plan / affirming steps taken     Current / Ongoing Stressors and Concerns:  Pt discussed her wishes to be able to move out of her  uncles's home. Pt processed reasons she can not move out at this moment such as helping to take care of grandpa and needing to find a job.       Treatment Objective(s) Addressed in This Session:   use cognitive strategies identified in therapy to challenge anxious thoughts     Intervention:     CBT: Reframed intrusive thoughts.    Solution Focus: Work on resume     Assessments completed prior to visit:  The following assessments were completed by patient for this visit:  PHQ9:       3/20/2023     2:36 AM 6/20/2023    11:46 AM 6/28/2023     1:00 AM 7/10/2023     1:03 AM 7/19/2023    11:33 AM   PHQ-9 SCORE   PHQ-9 Total Score MyChart 23 (Severe depression) 14 (Moderate depression) 16 (Moderately severe depression) 13 (Moderate depression) 15 (Moderately severe depression)   PHQ-9 Total Score 23 14 16 13 15     GAD7:       3/20/2023     2:42 AM 6/20/2023    12:44 AM 7/10/2023     1:04 AM 7/24/2023    10:25 AM 9/20/2023     7:51 AM   BRAN-7 SCORE   Total Score 21 (severe anxiety) 14 (moderate anxiety) 14 (moderate anxiety) 15 (severe anxiety) 16 (severe anxiety)   Total Score 21 14 14 15 16     CAGE-AID:       6/28/2023     1:19 AM   CAGE-AID Total Score   Total Score 4   Total Score MyChart 4 (A total score of 2 or greater is considered clinically significant)     PROMIS 10-Global Health (all questions and answers displayed):       6/28/2023     1:18 AM 7/10/2023     1:05 AM 7/24/2023    10:26 AM   PROMIS 10   In general, would you say your health is: Fair Good Good   In general, would you say your quality of life is: Fair Fair Good   In general, how would you rate your physical health? Fair Good Good   In general, how would you rate your mental health, including your mood and your ability to think? Fair Good Good   In general, how would you rate your satisfaction with your social activities and relationships? Good Excellent Very good   In general, please rate how well you carry out your usual social activities and  roles Very good Excellent Good   To what extent are you able to carry out your everyday physical activities such as walking, climbing stairs, carrying groceries, or moving a chair? Completely Completely Completely   In the past 7 days, how often have you been bothered by emotional problems such as feeling anxious, depressed, or irritable? Often Often Often   In the past 7 days, how would you rate your fatigue on average? Severe Moderate Moderate   In the past 7 days, how would you rate your pain on average, where 0 means no pain, and 10 means worst imaginable pain? 0 1 1   In general, would you say your health is: 2 3 3   In general, would you say your quality of life is: 2 2 3   In general, how would you rate your physical health? 2 3 3   In general, how would you rate your mental health, including your mood and your ability to think? 2 3 3   In general, how would you rate your satisfaction with your social activities and relationships? 3 5 4   In general, please rate how well you carry out your usual social activities and roles. (This includes activities at home, at work and in your community, and responsibilities as a parent, child, spouse, employee, friend, etc.) 4 5 3   To what extent are you able to carry out your everyday physical activities such as walking, climbing stairs, carrying groceries, or moving a chair? 5 5 5   In the past 7 days, how often have you been bothered by emotional problems such as feeling anxious, depressed, or irritable? 4 4 4   In the past 7 days, how would you rate your fatigue on average? 4 3 3   In the past 7 days, how would you rate your pain on average, where 0 means no pain, and 10 means worst imaginable pain? 0 1 1   Global Mental Health Score 9 12 12   Global Physical Health Score 14 15 15   PROMIS TOTAL - SUBSCORES 23 27 27     [place] Suicide Severity Rating Scale (Lifetime/Recent)      6/28/2023     3:29 PM   [place] Suicide Severity Rating (Lifetime/Recent)   Q1 Wish to  be Dead (Lifetime) Y   1. Wish to be Dead (Past 1 Month) Y   Wish to be Dead Description (Past 1 Month) passive SI chronic   Q2 Non-Specific Active Suicidal Thoughts (Lifetime) Y   Non-Specific Active Suicidal Thought Description (Lifetime) head banging   2. Non-Specific Active Suicidal Thoughts (Past 1 Month) N   Calculated C-SSRS Risk Score (Lifetime/Recent) Low Risk         ASSESSMENT: Current Emotional / Mental Status (status of significant symptoms):   Risk status (Self / Other harm or suicidal ideation)   Patient denies current fears or concerns for personal safety.   Patient denies current or recent suicidal ideation or behaviors.   Patient denies current or recent homicidal ideation or behaviors.   Patient denies current or recent self injurious behavior or ideation.   Patient denies other safety concerns.   Patient reports there has been no change in risk factors since their last session.     Patient reports there has been no change in protective factors since their last session.     Recommended that patient call 911 or go to the local ED should there be a change in any of these risk factors.     Appearance:   Appropriate    Eye Contact:   Good    Psychomotor Behavior: Normal    Attitude:   Cooperative    Orientation:   All   Speech    Rate / Production: Normal/ Responsive    Volume:  Normal    Mood:    Normal   Affect:    Appropriate    Thought Content:  Clear    Thought Form:  Coherent    Insight:    Fair      Medication Review:   No changes to current psychiatric medication(s)     Medication Compliance:   Yes     Changes in Health Issues:   None reported     Chemical Use Review:   Substance Use: Chemical use reviewed, no active concerns identified    Tobacco Use: No current tobacco use.      Diagnosis:  1. BRAN (generalized anxiety disorder)    2. Moderate episode of recurrent major depressive disorder (H)    3. Trauma and stressor-related disorder      Collateral Reports Completed:   Not  Applicable    PLAN: (Patient Tasks / Therapist Tasks / Other)  Pt to continue using CBT Skills.   Therapist will address organization skills.     June Lennon, [facility] 10/3/23   Note reviewed and clinical supervision by NATE Yung, [facility] 10/20/2023       _____________________________________________________________    Individual Treatment Plan    Patient's Name: Cyndie Mae  YOB: 2005    Date of Creation: 7/19/23  Date Treatment Plan Last Reviewed/Revised: 7/24/23    DSM5 Diagnoses: 296.32 (F33.1) Major Depressive Disorder, Recurrent Episode, Moderate _ and With anxious distress, 300.02 (F41.1) Generalized Anxiety Disorder, or 309.9 (F43.9) Unspecified Trauma and Stressor Related Disorder  Psychosocial / Contextual Factors: Pt lives with grandparents. Pt has a complex family unit dynamics.   PROMIS (reviewed every 90 days): 10/24/23    Referral / Collaboration:  Referral to another professional/service is not indicated at this time.    Anticipated number of session for this episode of care: 9-12 sessions  Anticipation frequency of session: Biweekly  Anticipated Duration of each session: 38-52 minutes  Treatment plan will be reviewed in 90 days or when goals have been changed.     MeasurableTreatment Goal(s) related to diagnosis / functional impairment(s)  Goal 1: Patient will experience a reduction of the intensity/duration of anxiety and its occurrence. Impacts of anxiety (e.g.Vomiting) will subside.      I will know I've met my goal when I don't have panic attacks.      Objective #A (Patient Action)    Patient will will experience a reduction in anxiety, will develop   more effective coping skills to manage anxiety symptoms, will develop healthy cognitive patterns and beliefs and will increase ability to function adaptively              Client will use cognitive strategies identified in therapy to challenge anxious thoughts.  Client will use relaxation strategies many  times per day to reduce the physical symptoms of anxiety.    Status: Continued - Date(s): 7/24/23    Intervention(s)  Therapist will teach  CBT skills to challenge intrusive thoughts  .Therapist will teach pt Mindfulness skills.    Objective #B  Patient will address relationship difficulties in a more adaptive manner. Patient will examine relationship hx and learn skills to more effectively communicate and be assertive.   Status: Continued - Date(s): 7/24/23    Intervention(s)  Therapist will teach assertiveness skills. Therapist will help pt set healthy boundaries .    Objective #C  Patient will identify negative self-talk and behaviors: challenge core beliefs, myths, and actions. Decrease frequency and intensity of feeling down, depressed, hopeless.    Status: Continued - Date(s): 7/24/23     Intervention(s)  Therapist will help pt identify negative core belief and cognitive distortions. Therapist will help pt to develop healthy cognitive patterns and beliefs.        Patient has reviewed and agreed to the above plan.      June Lennon, [facility]  July 24, 2023  Treatment plan reviewed and clinical supervision by NATE Yung, [facility] 8/3/2023

## 2023-10-17 ENCOUNTER — VIRTUAL VISIT (OUTPATIENT)
Dept: PSYCHOLOGY | Facility: CLINIC | Age: 18
End: 2023-10-17
Payer: COMMERCIAL

## 2023-10-17 DIAGNOSIS — F43.9 TRAUMA AND STRESSOR-RELATED DISORDER: ICD-10-CM

## 2023-10-17 DIAGNOSIS — F33.1 MODERATE EPISODE OF RECURRENT MAJOR DEPRESSIVE DISORDER (H): ICD-10-CM

## 2023-10-17 DIAGNOSIS — F41.1 GAD (GENERALIZED ANXIETY DISORDER): Primary | ICD-10-CM

## 2023-10-17 PROCEDURE — 90837 PSYTX W PT 60 MINUTES: CPT | Mod: VID

## 2023-10-23 NOTE — PROGRESS NOTES
[facility]                                     Progress Note    Patient Name: Cyndie Mae  Date: 10/17/23       Service Type: Individual      Session Start Time: 11:00 am Session End Time: 11:55 am       Session Length: 55 min    Session #: 8    Attendees: Client attended alone    Service Modality:  Video Visit:      Provider verified identity through the following two step process.  Patient provided:  Patient is known previously to provider    Telemedicine Visit: The patient's condition can be safely assessed and treated via synchronous audio and visual telemedicine encounter.      Reason for Telemedicine Visit: Patient convenience (e.g. access to timely appointments / distance to available provider)    Originating Site (Patient Location): Patient's home    Distant Site (Provider Location): Provider Remote Setting- Home Office    Consent:  The patient/guardian has verbally consented to: the potential risks and benefits of telemedicine (video visit) versus in person care; bill my insurance or make self-payment for services provided; and responsibility for payment of non-covered services.     Patient would like the video invitation sent by:  My Chart    Mode of Communication:  Video Conference via [facility]    Distant Location (Provider):  Off-site    As the provider I attest to compliance with applicable laws and regulations related to telemedicine.    DATA  Extended Session (53+ minutes):   - Extensive content to cover in session.  Interactive Complexity: No  Crisis: No     Progress Since Last Session (Related to Symptoms / Goals / Homework):   Symptoms: Improving Pt reported positive mood    Homework: Partially completed         Episode of Care Goals: Minimal progress - ACTION (Actively working towards change); Intervened by reinforcing change plan / affirming steps taken     Current / Ongoing Stressors and Concerns:   Pt shared her grandma and family have been needing a lot of her help.  Pt discussed being so busy and not having the time to do the things she wants to accomplish like study for drivers permit and finishing resume.       Treatment Objective(s) Addressed in This Session:   use relaxation strategies many  times per day to reduce the physical symptoms of anxiety  Patient will will experience a reduction in anxiety, will develop  more effective coping skills to manage anxiety symptoms      Intervention:     Solution Focus: Organization skills- prioritize tasks.   Mindfulness: Imagery (nature, birds) to stay in present moment and cope with anxiety symptoms.     Assessments completed prior to visit:  The following assessments were completed by patient for this visit:  PHQ9:       3/20/2023     2:36 AM 6/20/2023    11:46 AM 6/28/2023     1:00 AM 7/10/2023     1:03 AM 7/19/2023    11:33 AM   PHQ-9 SCORE   PHQ-9 Total Score MyChart 23 (Severe depression) 14 (Moderate depression) 16 (Moderately severe depression) 13 (Moderate depression) 15 (Moderately severe depression)   PHQ-9 Total Score 23 14 16 13 15     GAD7:       3/20/2023     2:42 AM 6/20/2023    12:44 AM 7/10/2023     1:04 AM 7/24/2023    10:25 AM 9/20/2023     7:51 AM   BRAN-7 SCORE   Total Score 21 (severe anxiety) 14 (moderate anxiety) 14 (moderate anxiety) 15 (severe anxiety) 16 (severe anxiety)   Total Score 21 14 14 15 16     CAGE-AID:       6/28/2023     1:19 AM   CAGE-AID Total Score   Total Score 4   Total Score MyChart 4 (A total score of 2 or greater is considered clinically significant)     PROMIS 10-Global Health (all questions and answers displayed):       6/28/2023     1:18 AM 7/10/2023     1:05 AM 7/24/2023    10:26 AM   PROMIS 10   In general, would you say your health is: Fair Good Good   In general, would you say your quality of life is: Fair Fair Good   In general, how would you rate your physical health? Fair Good Good   In general, how would you rate your mental health, including your mood and your ability to  think? Fair Good Good   In general, how would you rate your satisfaction with your social activities and relationships? Good Excellent Very good   In general, please rate how well you carry out your usual social activities and roles Very good Excellent Good   To what extent are you able to carry out your everyday physical activities such as walking, climbing stairs, carrying groceries, or moving a chair? Completely Completely Completely   In the past 7 days, how often have you been bothered by emotional problems such as feeling anxious, depressed, or irritable? Often Often Often   In the past 7 days, how would you rate your fatigue on average? Severe Moderate Moderate   In the past 7 days, how would you rate your pain on average, where 0 means no pain, and 10 means worst imaginable pain? 0 1 1   In general, would you say your health is: 2 3 3   In general, would you say your quality of life is: 2 2 3   In general, how would you rate your physical health? 2 3 3   In general, how would you rate your mental health, including your mood and your ability to think? 2 3 3   In general, how would you rate your satisfaction with your social activities and relationships? 3 5 4   In general, please rate how well you carry out your usual social activities and roles. (This includes activities at home, at work and in your community, and responsibilities as a parent, child, spouse, employee, friend, etc.) 4 5 3   To what extent are you able to carry out your everyday physical activities such as walking, climbing stairs, carrying groceries, or moving a chair? 5 5 5   In the past 7 days, how often have you been bothered by emotional problems such as feeling anxious, depressed, or irritable? 4 4 4   In the past 7 days, how would you rate your fatigue on average? 4 3 3   In the past 7 days, how would you rate your pain on average, where 0 means no pain, and 10 means worst imaginable pain? 0 1 1   Global Mental Health Score 9 12 12    Global Physical Health Score 14 15 15   PROMIS TOTAL - SUBSCORES 23 27 27     [place] Suicide Severity Rating Scale (Lifetime/Recent)      6/28/2023     3:29 PM   [place] Suicide Severity Rating (Lifetime/Recent)   Q1 Wish to be Dead (Lifetime) Y   1. Wish to be Dead (Past 1 Month) Y   Wish to be Dead Description (Past 1 Month) passive SI chronic   Q2 Non-Specific Active Suicidal Thoughts (Lifetime) Y   Non-Specific Active Suicidal Thought Description (Lifetime) head banging   2. Non-Specific Active Suicidal Thoughts (Past 1 Month) N   Calculated C-SSRS Risk Score (Lifetime/Recent) Low Risk         ASSESSMENT: Current Emotional / Mental Status (status of significant symptoms):   Risk status (Self / Other harm or suicidal ideation)   Patient denies current fears or concerns for personal safety.   Patient denies current or recent suicidal ideation or behaviors.   Patient denies current or recent homicidal ideation or behaviors.   Patient denies current or recent self injurious behavior or ideation.   Patient denies other safety concerns.   Patient reports there has been no change in risk factors since their last session.     Patient reports there has been no change in protective factors since their last session.     Recommended that patient call 911 or go to the local ED should there be a change in any of these risk factors.     Appearance:   Appropriate    Eye Contact:   Good    Psychomotor Behavior: Normal    Attitude:   Cooperative    Orientation:   All   Speech    Rate / Production: Normal/ Responsive    Volume:  Normal    Mood:    Normal   Affect:    Appropriate    Thought Content:  Clear    Thought Form:  Coherent    Insight:    Fair      Medication Review:   No changes to current psychiatric medication(s)     Medication Compliance:   Yes     Changes in Health Issues:   None reported     Chemical Use Review:   Substance Use: Chemical use reviewed, no active concerns identified    Tobacco Use: No current  tobacco use.      Diagnosis:  1. BRAN (generalized anxiety disorder)    2. Moderate episode of recurrent major depressive disorder (H)    3. Trauma and stressor-related disorder      Collateral Reports Completed:   Not Applicable    PLAN: (Patient Tasks / Therapist Tasks / Other)  Pt to practice using Imagery.   Therapist will continue addressing organization skills.     June Lennon, [facility] 10/17/23   Note reviewed and clinical supervision by NATE Yung, [facility] 10/23/2023       _____________________________________________________________    Individual Treatment Plan    Patient's Name: Cyndie Mae  YOB: 2005    Date of Creation: 7/19/23  Date Treatment Plan Last Reviewed/Revised: 7/24/23    DSM5 Diagnoses: 296.32 (F33.1) Major Depressive Disorder, Recurrent Episode, Moderate _ and With anxious distress, 300.02 (F41.1) Generalized Anxiety Disorder, or 309.9 (F43.9) Unspecified Trauma and Stressor Related Disorder  Psychosocial / Contextual Factors: Pt lives with grandparents. Pt has a complex family unit dynamics.   PROMIS (reviewed every 90 days): 10/24/23    Referral / Collaboration:  Referral to another professional/service is not indicated at this time.    Anticipated number of session for this episode of care: 9-12 sessions  Anticipation frequency of session: Biweekly  Anticipated Duration of each session: 38-52 minutes  Treatment plan will be reviewed in 90 days or when goals have been changed.     MeasurableTreatment Goal(s) related to diagnosis / functional impairment(s)  Goal 1: Patient will experience a reduction of the intensity/duration of anxiety and its occurrence. Impacts of anxiety (e.g.Vomiting) will subside.      I will know I've met my goal when I don't have panic attacks.      Objective #A (Patient Action)    Patient will will experience a reduction in anxiety, will develop   more effective coping skills to manage anxiety symptoms, will develop healthy  cognitive patterns and beliefs and will increase ability to function adaptively              Client will use cognitive strategies identified in therapy to challenge anxious thoughts.  Client will use relaxation strategies many times per day to reduce the physical symptoms of anxiety.    Status: Continued - Date(s): 7/24/23    Intervention(s)  Therapist will teach  CBT skills to challenge intrusive thoughts  .Therapist will teach pt Mindfulness skills.    Objective #B  Patient will address relationship difficulties in a more adaptive manner. Patient will examine relationship hx and learn skills to more effectively communicate and be assertive.   Status: Continued - Date(s): 7/24/23    Intervention(s)  Therapist will teach assertiveness skills. Therapist will help pt set healthy boundaries .    Objective #C  Patient will identify negative self-talk and behaviors: challenge core beliefs, myths, and actions. Decrease frequency and intensity of feeling down, depressed, hopeless.    Status: Continued - Date(s): 7/24/23     Intervention(s)  Therapist will help pt identify negative core belief and cognitive distortions. Therapist will help pt to develop healthy cognitive patterns and beliefs.        Patient has reviewed and agreed to the above plan.      June Lennon, [facility]  July 24, 2023  Treatment plan reviewed and clinical supervision by NATE Yung, [facility] 8/3/2023

## 2024-06-27 ASSESSMENT — ANXIETY QUESTIONNAIRES
3. WORRYING TOO MUCH ABOUT DIFFERENT THINGS: NEARLY EVERY DAY
8. IF YOU CHECKED OFF ANY PROBLEMS, HOW DIFFICULT HAVE THESE MADE IT FOR YOU TO DO YOUR WORK, TAKE CARE OF THINGS AT HOME, OR GET ALONG WITH OTHER PEOPLE?: EXTREMELY DIFFICULT
7. FEELING AFRAID AS IF SOMETHING AWFUL MIGHT HAPPEN: NEARLY EVERY DAY
GAD7 TOTAL SCORE: 19
4. TROUBLE RELAXING: NEARLY EVERY DAY
1. FEELING NERVOUS, ANXIOUS, OR ON EDGE: NEARLY EVERY DAY
7. FEELING AFRAID AS IF SOMETHING AWFUL MIGHT HAPPEN: NEARLY EVERY DAY
2. NOT BEING ABLE TO STOP OR CONTROL WORRYING: NEARLY EVERY DAY
IF YOU CHECKED OFF ANY PROBLEMS ON THIS QUESTIONNAIRE, HOW DIFFICULT HAVE THESE PROBLEMS MADE IT FOR YOU TO DO YOUR WORK, TAKE CARE OF THINGS AT HOME, OR GET ALONG WITH OTHER PEOPLE: EXTREMELY DIFFICULT
GAD7 TOTAL SCORE: 19
5. BEING SO RESTLESS THAT IT IS HARD TO SIT STILL: MORE THAN HALF THE DAYS
6. BECOMING EASILY ANNOYED OR IRRITABLE: MORE THAN HALF THE DAYS

## 2024-07-02 ENCOUNTER — VIRTUAL VISIT (OUTPATIENT)
Dept: FAMILY MEDICINE | Facility: CLINIC | Age: 19
End: 2024-07-02
Attending: PHYSICIAN ASSISTANT
Payer: COMMERCIAL

## 2024-07-02 DIAGNOSIS — F32.A ANXIETY AND DEPRESSION: Primary | ICD-10-CM

## 2024-07-02 DIAGNOSIS — F41.9 ANXIETY AND DEPRESSION: Primary | ICD-10-CM

## 2024-07-02 PROCEDURE — 99213 OFFICE O/P EST LOW 20 MIN: CPT | Mod: 95 | Performed by: PHYSICIAN ASSISTANT

## 2024-07-02 RX ORDER — ESCITALOPRAM OXALATE 10 MG/1
TABLET ORAL
Qty: 90 TABLET | Refills: 0 | Status: SHIPPED | OUTPATIENT
Start: 2024-07-02 | End: 2024-09-03

## 2024-07-02 ASSESSMENT — PATIENT HEALTH QUESTIONNAIRE - PHQ9
SUM OF ALL RESPONSES TO PHQ QUESTIONS 1-9: 19
10. IF YOU CHECKED OFF ANY PROBLEMS, HOW DIFFICULT HAVE THESE PROBLEMS MADE IT FOR YOU TO DO YOUR WORK, TAKE CARE OF THINGS AT HOME, OR GET ALONG WITH OTHER PEOPLE: SOMEWHAT DIFFICULT
SUM OF ALL RESPONSES TO PHQ QUESTIONS 1-9: 19

## 2024-07-02 NOTE — PROGRESS NOTES
Cyndie is a 18 year old who is being evaluated via a billable video visit.    How would you like to obtain your AVS? MyChart  If the video visit is dropped, the invitation should be resent by: Send to e-mail at: [email or web address]  Will anyone else be joining your video visit? No      Assessment & Plan     Anxiety and depression  She will start taking lexapro. Start with 5 mg and increase to 10 mg after 7-14 days.   Side effects and how to take the medication discussed.  Start counseling also.   I will see her back in 2 months.   Notify if any acute changes in the meantime.   - escitalopram (LEXAPRO) 10 MG tablet; 1/2 tablet daily for 7-14 days, then increase to 1 tablet daily  - Adult Mental Health  Referral; Future          Depression Screening Follow Up               No data to display                    Follow Up Actions Taken  Crisis resource information provided in the After Visit Summary  Mental Health Referral placed    Discussed the following ways the patient can remain in a safe environment:  be around others        Subjective   Cyndie is a 18 year old, presenting for the following health issues:  Depression (Discuss getting back on medications) and Anxiety      Video Start Time:  1:55 PM    History of Present Illness       Mental Health Follow-up:  Patient presents to follow-up on Depression & Anxiety.Patient's depression since last visit has been:  Worse  The patient is having other symptoms associated with depression.  Patient's anxiety since last visit has been:  Worse  The patient is having other symptoms associated with anxiety.  Any significant life events: job concerns and grief or loss  Patient is feeling anxious or having panic attacks.  Patient has no concerns about alcohol or drug use.    She eats 2-3 servings of fruits and vegetables daily.She consumes 1 sweetened beverage(s) daily.She exercises with enough effort to increase her heart rate 30 to 60 minutes per day.  She exercises  with enough effort to increase her heart rate 5 days per week. She is missing 7 dose(s) of medications per week.  She is not taking prescribed medications regularly due to other.         She has had 2 deaths in the family since our last appointment.   She stopped taking the sertraline after feeling nauseous at the 150 mg dose.   She has a supportive family, but they don't talk openly about emotions. She is interested in working with counseling once again.     No suicidal thoughts or thoughts of harm. She does feel that life would be better without her at times.       Review of Systems  Constitutional, HEENT, cardiovascular, pulmonary, GI, , musculoskeletal, neuro, skin, endocrine and psych systems are negative, except as otherwise noted.      Objective           Vitals:  No vitals were obtained today due to virtual visit.    Physical Exam   GENERAL: alert and no distress  EYES: Eyes grossly normal to inspection.  No discharge or erythema, or obvious scleral/conjunctival abnormalities.  RESP: No audible wheeze, cough, or visible cyanosis.    SKIN: Visible skin clear. No significant rash, abnormal pigmentation or lesions.  NEURO: Cranial nerves grossly intact.  Mentation and speech appropriate for age.  PSYCH: Appropriate affect, tone, and pace of words          Video-Visit Details    Type of service:  Video Visit   Video End Time:2:12 PM  Originating Location (pt. Location): Home    Distant Location (provider location):  On-site  Platform used for Video Visit: Lorenzo  Signed Electronically by: Kristen M. Kehr, PA-C

## 2024-07-07 ENCOUNTER — HEALTH MAINTENANCE LETTER (OUTPATIENT)
Age: 19
End: 2024-07-07

## 2024-08-04 ASSESSMENT — ANXIETY QUESTIONNAIRES
8. IF YOU CHECKED OFF ANY PROBLEMS, HOW DIFFICULT HAVE THESE MADE IT FOR YOU TO DO YOUR WORK, TAKE CARE OF THINGS AT HOME, OR GET ALONG WITH OTHER PEOPLE?: SOMEWHAT DIFFICULT
5. BEING SO RESTLESS THAT IT IS HARD TO SIT STILL: SEVERAL DAYS
2. NOT BEING ABLE TO STOP OR CONTROL WORRYING: MORE THAN HALF THE DAYS
6. BECOMING EASILY ANNOYED OR IRRITABLE: SEVERAL DAYS
1. FEELING NERVOUS, ANXIOUS, OR ON EDGE: SEVERAL DAYS
4. TROUBLE RELAXING: NEARLY EVERY DAY
IF YOU CHECKED OFF ANY PROBLEMS ON THIS QUESTIONNAIRE, HOW DIFFICULT HAVE THESE PROBLEMS MADE IT FOR YOU TO DO YOUR WORK, TAKE CARE OF THINGS AT HOME, OR GET ALONG WITH OTHER PEOPLE: SOMEWHAT DIFFICULT
7. FEELING AFRAID AS IF SOMETHING AWFUL MIGHT HAPPEN: SEVERAL DAYS
GAD7 TOTAL SCORE: 11
3. WORRYING TOO MUCH ABOUT DIFFERENT THINGS: MORE THAN HALF THE DAYS
GAD7 TOTAL SCORE: 11
GAD7 TOTAL SCORE: 11
7. FEELING AFRAID AS IF SOMETHING AWFUL MIGHT HAPPEN: SEVERAL DAYS

## 2024-08-07 ENCOUNTER — VIRTUAL VISIT (OUTPATIENT)
Dept: BEHAVIORAL HEALTH | Facility: CLINIC | Age: 19
End: 2024-08-07
Payer: COMMERCIAL

## 2024-08-07 DIAGNOSIS — F43.9 TRAUMA AND STRESSOR-RELATED DISORDER: ICD-10-CM

## 2024-08-07 DIAGNOSIS — F33.1 MODERATE EPISODE OF RECURRENT MAJOR DEPRESSIVE DISORDER (H): ICD-10-CM

## 2024-08-07 DIAGNOSIS — F41.1 GENERALIZED ANXIETY DISORDER: Primary | ICD-10-CM

## 2024-08-07 PROCEDURE — 90791 PSYCH DIAGNOSTIC EVALUATION: CPT | Mod: 95

## 2024-08-07 ASSESSMENT — COLUMBIA-SUICIDE SEVERITY RATING SCALE - C-SSRS
3. HAVE YOU BEEN THINKING ABOUT HOW YOU MIGHT KILL YOURSELF?: NO
5. HAVE YOU STARTED TO WORK OUT OR WORKED OUT THE DETAILS OF HOW TO KILL YOURSELF? DO YOU INTEND TO CARRY OUT THIS PLAN?: NO
2. HAVE YOU ACTUALLY HAD ANY THOUGHTS OF KILLING YOURSELF?: YES
1. HAVE YOU WISHED YOU WERE DEAD OR WISHED YOU COULD GO TO SLEEP AND NOT WAKE UP?: YES
2. HAVE YOU ACTUALLY HAD ANY THOUGHTS OF KILLING YOURSELF?: NO
ATTEMPT LIFETIME: NO
4. HAVE YOU HAD THESE THOUGHTS AND HAD SOME INTENTION OF ACTING ON THEM?: NO
REASONS FOR IDEATION PAST MONTH: COMPLETELY TO END OR STOP THE PAIN (YOU COULDN'T GO ON LIVING WITH THE PAIN OR HOW YOU WERE FEELING)
TOTAL  NUMBER OF INTERRUPTED ATTEMPTS LIFETIME: NO
6. HAVE YOU EVER DONE ANYTHING, STARTED TO DO ANYTHING, OR PREPARED TO DO ANYTHING TO END YOUR LIFE?: NO
1. IN THE PAST MONTH, HAVE YOU WISHED YOU WERE DEAD OR WISHED YOU COULD GO TO SLEEP AND NOT WAKE UP?: NO
TOTAL  NUMBER OF ABORTED OR SELF INTERRUPTED ATTEMPTS LIFETIME: NO
REASONS FOR IDEATION LIFETIME: COMPLETELY TO END OR STOP THE PAIN (YOU COULDN'T GO ON LIVING WITH THE PAIN OR HOW YOU WERE FEELING)

## 2024-08-07 ASSESSMENT — PATIENT HEALTH QUESTIONNAIRE - PHQ9
SUM OF ALL RESPONSES TO PHQ QUESTIONS 1-9: 13
SUM OF ALL RESPONSES TO PHQ QUESTIONS 1-9: 13
10. IF YOU CHECKED OFF ANY PROBLEMS, HOW DIFFICULT HAVE THESE PROBLEMS MADE IT FOR YOU TO DO YOUR WORK, TAKE CARE OF THINGS AT HOME, OR GET ALONG WITH OTHER PEOPLE: SOMEWHAT DIFFICULT
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
## 2024-08-22 ENCOUNTER — TELEPHONE (OUTPATIENT)
Dept: BEHAVIORAL HEALTH | Facility: CLINIC | Age: 19
End: 2024-08-22
Payer: COMMERCIAL

## 2024-08-22 ENCOUNTER — VIRTUAL VISIT (OUTPATIENT)
Dept: BEHAVIORAL HEALTH | Facility: CLINIC | Age: 19
End: 2024-08-22
Payer: COMMERCIAL

## 2024-08-22 DIAGNOSIS — F43.9 TRAUMA AND STRESSOR-RELATED DISORDER: ICD-10-CM

## 2024-08-22 DIAGNOSIS — F88 SENSORY PROCESSING DIFFICULTY: ICD-10-CM

## 2024-08-22 DIAGNOSIS — F33.1 MODERATE EPISODE OF RECURRENT MAJOR DEPRESSIVE DISORDER (H): ICD-10-CM

## 2024-08-22 DIAGNOSIS — F41.1 GENERALIZED ANXIETY DISORDER: Primary | ICD-10-CM

## 2024-08-22 DIAGNOSIS — F32.A ANXIETY AND DEPRESSION: ICD-10-CM

## 2024-08-22 DIAGNOSIS — F41.9 ANXIETY AND DEPRESSION: ICD-10-CM

## 2024-08-22 PROCEDURE — 90834 PSYTX W PT 45 MINUTES: CPT | Mod: 95

## 2024-08-22 NOTE — PROGRESS NOTES
[facility] Primary Care: Integrated Behavioral Health  8/22/24        Behavioral Health Clinician Progress Note     Patient Name: Cyndie Mae                                                         Service Type:             Individual                            Service Location:       Face to Face in Clinic                 Session Start Time:  12:31P                  Session End Time: 1:10P                            Session Length:        38 - 52                  Attendees:     Patient                 Service Modality:  Video Visit:      Provider verified identity through the following two step process.  Patient provided:  Patient is known previously to provider     Telemedicine Visit: The patient's condition can be safely assessed and treated via synchronous audio and visual telemedicine encounter.       Reason for Telemedicine Visit: Patient has requested telehealth visit     Originating Site (Patient Location): Patient's home     Distant Site (Provider Location): [facility] MENTAL [facility] & ADDICTION [facility]     Consent:  The patient/guardian has verbally consented to: the potential risks and benefits of telemedicine (video visit) versus in person care; bill my insurance or make self-payment for services provided; and responsibility for payment of non-covered services.      Patient would like the video invitation sent by:  My Chart     Mode of Communication:  Video Conference via [facility]     Distant Location (Provider):  On-site     As the provider I attest to compliance with applicable laws and regulations related to telemedicine.     Visit Activities (Refresh list every visit): [facility] Only     Diagnostic Assessment Date: 8/7/24  Treatment Plan Date: 8/22/24     PHQ2:       8/22/2024     1:30 AM 7/24/2023    10:24 AM 7/19/2023    11:33 AM 3/20/2023     2:42 AM 3/20/2023     2:36 AM   PHQ-2 ( 1999 [employer])   Q1: Little interest or pleasure in doing things 1 1 1  1   Q2:  "Feeling down, depressed or hopeless 1 1 2  3   PHQ-2 Score 2 2 3  4   Q1: Little interest or pleasure in doing things Several days Several days Several days  Several days   Q2: Feeling down, depressed or hopeless Several days Several days More than half the days  Nearly every day   PHQ-2 Score 2 2 3 Incomplete 4     GAD7:       3/20/2023     2:42 AM 6/20/2023    12:44 AM 7/10/2023     1:04 AM 7/24/2023    10:25 AM 9/20/2023     7:51 AM 6/27/2024     9:29 PM 8/4/2024     1:30 PM   BRAN-7 SCORE   Total Score 21 (severe anxiety) 14 (moderate anxiety) 14 (moderate anxiety) 15 (severe anxiety) 16 (severe anxiety) 19 (severe anxiety) 11 (moderate anxiety)   Total Score 21 14 14 15 16 19 11         Treatment Objective(s) Addressed in This Session:     Depressed Mood: Increase interest, engagement, and pleasure in doing things  Decrease frequency and intensity of feeling down, depressed, hopeless  Identify negative self-talk and behaviors: challenge core beliefs, myths, and actions  Improve concentration, focus, and mindfulness in daily activities   Feel less fidgety, restless or slow in daily activities / interpersonal interactions  Decrease thoughts that you'd be better off dead or of suicide / self-harm  Anxiety: will experience a reduction in anxiety, will develop more effective coping skills to manage anxiety symptoms, will develop healthy cognitive patterns and beliefs and will increase ability to function adaptively     Progress on Treatment Objective(s) / Homework:  Satisfactory progress - ACTION (Actively working towards change); Intervened by reinforcing change plan / affirming steps taken      Update: Pt says it's been busy lately and things appear to be \"going well.\" Spending time with family. Pt showed writer new stuffy she got from a friend in [place]. Pt dash passed away in December of 2023. The [place] culture has a process upon death where following three days after death everyone comes over to celebrate his " "life. This consists of hosting many people, cooking, etc. Pt said body was taken more quickly than not and caused some emotional turmoil within family members and \"all the old ladies stole our food.\" Pt feels her grandma has lately been getting mad at her for \"even cooking in the house.\" Pt is going to start back this semester, decided environmental science. Pt plans to focus on school, depending on how the stress mgmt goes she will consider getting another job. Haven't been pursuing license anymore. Pt adds that her and sister have \"stopped talking\" and stopped going to her house. Pt has been taking her Lexapro routinely and has good adherence. Pt also said she tried to coordinate the ASD testing and the one clinic doesn't do ASD testing and was recommended Dick and she will look into this. Pt feels like her mood is getting better, motivation improving.     Motivational Interviewing     MI Intervention: Expressed Empathy/Understanding, Supported Autonomy, Collaboration, Evocation, Permission to raise concern or advise, Open-ended questions and Reflections: simple and complex      Change Talk Expressed by the Patient: Desire to change Ability to change Reasons to change Need to change     Provider Response to Change Talk: E - Evoked more info from patient about behavior change, A - Affirmed patient's thoughts, decisions, or attempts at behavior change, R - Reflected patient's change talk and S - Summarized patient's change talk statements     Care Plan review completed: Yes     Medication Review:  No current psychiatric medications prescribed      Current Outpatient Medications   Medication Sig Dispense Refill    escitalopram (LEXAPRO) 10 MG tablet 1/2 tablet daily for 7-14 days, then increase to 1 tablet daily 90 tablet 0     No current facility-administered medications for this visit.      Medication Compliance:  Yes     Changes in Health Issues:              None reported     Chemical Use Review:              " Substance Use: Chemical use reviewed, no active concerns identified               Tobacco Use: No current tobacco use.       Assessment:  Current Emotional / Mental Status (status of significant symptoms):     Safety Assessment:   Patient denies current homicidal ideation and behaviors.  Patient denies current self-injurious ideation and behaviors.    Patient denied risk behaviors associated with substance use.  Patient denies any high risk behaviors associated with mental health symptoms.  Patient reports the following current concerns for their personal safety: None.  Patient reports there are not firearms in the house.      A safety and risk management plan has not been developed at this time, however patient was encouraged to call [facility] should there be a change in any of these risk factors.     Mental Status Exam:  Appearance:                Appropriate    Eye Contact:               Good   Psychomotor:              Restless       Gait / station:           no problem  Attitude / Demeanor:   Cooperative  Interested Friendly Pleasant Attentive  Speech      Rate / Production:   Normal/ Responsive Talkative      Volume:                   Normal  volume      Language:               intact  Mood:                          Anxious  Depressed  Sad  Dysphoric Anhedonia  Affect:                          Blunted    Thought Content:        Clear  Rumination  (pt states SI is passive and chronic)  Thought Process:        Coherent  Logical       Associations:           No loosening of associations  Insight:                         Good   Judgment:                   Intact   Orientation:                 All  Attention/concentration:          Good     Diagnoses:  1. Generalized anxiety disorder    2. Moderate episode of recurrent major depressive disorder (H)    3. Trauma and stressor-related disorder       Collateral Reports Completed:  Not Applicable     Plan: (Homework, other):  Patient was given information  "about behavioral services and encouraged to schedule a follow up appointment with the clinic [facility] 1x month. She was also given CD Recommendations: No indications of CD issues.       JOSETTE Watts, [facility]  8/22/24  ______________________________________________________________________     Integrated Primary Care Behavioral Health Treatment Plan     Patient's Name: Cyndie Mae                       YOB: 2005     Date of Creation: 8/22/24  Date Treatment Plan Last Reviewed/Revised: NA     DSM5 Diagnoses:   296.32 (F33.1) Major Depressive Disorder, Recurrent Episode, Moderate   300.02 (F41.1) Generalized Anxiety Disorder  309.9 (F43.9) Unspecified Trauma and Stressor Related Disorder     Psychosocial / Contextual Factors:   \"Anxiety and depression. There are also potential concerns for OCD and autism.\"     Referral / Collaboration:  Was/were discussed and patient will pursue-ASD testing     Anticipated number of session for this episode of care: PRN  Anticipation frequency of session: PRN  Anticipated Duration of each session: 16-37 minutes  Treatment plan will be reviewed in 90 days or when goals have been changed.      MeasurableTreatment Goal(s) related to diagnosis / functional impairment(s)     Goal:  Patient will reduce symptoms of depression and suicidal thinking and increase life functioning; effectively reduce depressive symptoms as evidenced by a reduced PHQ9 score of 5 or less with occurrence of several days or less.     Objective #A:  will experience a reduction in depressed mood, will develop more effective coping skills to manage depressive symptoms and will develop healthy cognitive patterns and beliefs   Client will Increase interest, engagement, and pleasure in doing things  Decrease frequency and intensity of feeling down, depressed, hopeless  Identify negative self-talk and behaviors: challenge core beliefs, myths, and actions  Decrease thoughts that you'd be better off " dead or of suicide / self-harm.  Status: NEW 8/22/24     Objective #B:  will increase ability to function adaptively and will continue to take medications as prescribed / participate in supportive activities and services   Client will Increase interest, engagement, and pleasure in doing things  Improve quantity and quality of night time sleep / decrease daytime naps  Feel less tired and more energy during the day    Improve diet, appetite, mindful eating, and / or meal planning  Identify negative self-talk and behaviors: challenge core beliefs, myths, and actions  Improve concentration, focus, and mindfulness in daily activities .  Status: NEW 8/22/24     Objective #C:  will address relationship difficulties in a more adaptive manner  Client will examine relationship hx and learn skills to more effectively communicate and be assertive.  Status: NEW 8/22/24     Goal:  Patient will reduce symptoms and impacts of anxiety - generalized anxiety; effectively reduce anxiety symptoms as evidenced by a reduced GAD7 score of 5 or less with the occurrence of several days or less.     Objective #A:  will experience a reduction in anxiety, will develop   more effective coping skills to manage anxiety symptoms, will develop healthy cognitive patterns and beliefs and will increase ability to function adaptively              Client will use cognitive strategies identified in therapy to challenge anxious thoughts.  Status: NEW 8/22/24     Objective #B:  will experience a reduction in anxiety, will develop more effective coping skills to manage anxiety symptoms, will develop healthy cognitive patterns and beliefs and will increase ability to function adaptively  Client will use relaxation strategies many times per day to reduce the physical symptoms of anxiety.  Status: NEW 8/22/24     Intervention(s)  Psycho-education regarding mental health diagnoses and treatment options     Skills training  Explore skills useful to client in  current situation  Skills include assertiveness, communication, conflict management, problem-solving, relaxation, etc.     Solution-Focused Therapy  Explore patterns in patient's relationships and discussed options for new behaviors  Explore patterns in patient's actions and choices and discussed options for new behaviors     Cognitive-behavioral Therapy  Discuss common cognitive distortions, identified them in patient's life  Explore ways to challenge, replace, and act against these cognitions     Acceptance and Commitment Therapy  Explore and identified important values in patient's life  Discuss ways to commit to behavioral activation around these values     Psychodynamic psychotherapy  Discuss patient's emotional dynamics and issues and how they impact behaviors  Explore patient's history of relationships and how they impact present behaviors  Explore how to work with and make changes in these schemas and patterns     Behavioral Activation  Discuss steps patient can take to become more involved in meaningful activity  Identify barriers to these activities and explored possible solutions     Mindfulness-Based Strategies  Discuss skills based on development and application of mindfulness  Skills drawn from dialectical behavior therapy, mindfulness-based stress reduction, mindfulness-based cognitive therapy, etc.      Patient has reviewed and agreed to the above plan.        SYD HERNANDEZ, [facility]                    8/22/24

## 2024-08-22 NOTE — TELEPHONE ENCOUNTER
[facility] received referral from [facility] for [facility] services. Patient is eligible for [facility] so [facility] called patient to offer services. Patient didn't answer and phone went to voicemail, so [facility] left a message requesting a call back. [facility] also sent a AddThis message.     EDGAR Sharpe  Behavioral Health [place] ([facility])   [facility], & [facility]  [phone]

## 2024-08-27 ENCOUNTER — THERAPY VISIT (OUTPATIENT)
Dept: OCCUPATIONAL THERAPY | Facility: CLINIC | Age: 19
End: 2024-08-27
Attending: PHYSICIAN ASSISTANT
Payer: COMMERCIAL

## 2024-08-27 DIAGNOSIS — F88 SENSORY PROCESSING DIFFICULTY: ICD-10-CM

## 2024-08-27 PROCEDURE — 97530 THERAPEUTIC ACTIVITIES: CPT | Mod: GO

## 2024-08-27 PROCEDURE — 97165 OT EVAL LOW COMPLEX 30 MIN: CPT | Mod: GO

## 2024-08-27 NOTE — PROGRESS NOTES
"OCCUPATIONAL THERAPY EVALUATION  Type of Visit: Evaluation       Fall Risk Screen:  Fall screen completed by: OT  Have you fallen 2 or more times in the past year?: No  Have you fallen and had an injury in the past year?: No  Is patient a fall risk?: Department fall risk interventions implemented    Subjective      Presenting condition or subjective complaint: Sensory issues with hearing and smell  Date of onset: 08/22/24 (order date)    Relevant medical history:     Dates & types of surgery:      Prior diagnostic imaging/testing results:       Prior therapy history for the same diagnosis, illness or injury: No      Occupational Profile: Patient is a 18 year old female who was referred to outpatient occupational therapy at the request of Kehr, Kristen M, PA-C due to sensory and autism concerns. She sees a mental health provide for treatment of anxiety/depression who recommended a sensory evaluation. Per order, \"Clinical questions: sensory evaluation / determine autism spectrum / sensory processing disorder.\" Patient presented to session independetnlefra. She endorses sensory sensitivities including aversions to certain smells (ie: sea food), sound sensitivities (ie: loud conversations), phobia related to dirty bathrooms, seeks movement such as rocking when nervous/anxious. She has concerns with autism-like tendencies including challenges communicating and responding appropriately in social situations.     Prior Level of Function  Transfers: Independent  Ambulation: Independent  ADL: Independent  IADL: Finances, Housekeeping, Laundry, Meal preparation, Medication management, School    Living Environment  Social support: With family members   Type of home: House; 1 level; Basement   Stairs to enter the home: Yes 4 Is there a railing: No     Ramp: No   Stairs inside the home: Yes 12 Is there a railing: Yes     Help at home: Self Cares (home health aide/personal care attendant, family, etc); Home management tasks (cooking, " cleaning); Assist for driving and community activities  Equipment owned:       Employment: No    Hobbies/Interests: Playing games, singing, arts and crafts, organization    Patient goals for therapy: I have trouble being at home without having headphones on, and sometimes listening to music as well. Sometimes, it can trigger a panic attack. I also can't stand certain smells, like fish, without almost throwing up. It feels like I'm overreacting    Pain assessment: Pain denied     Objective     Cognitive Status Examination  Orientation: Oriented to person, place and time   Level of Consciousness: Alert  Follows Commands and Answers Questions: 100% of the time  Personal Safety and Judgement: Intact  Memory: Intact  Attention: No deficits identified  She has concerns with autism-like tendencies including challenges communicating and responding appropriately in social situations.    VISUAL SKILLS  Visual Acuity: No deficits identified  Visual Field: Appears normal      SENSORY  She endorses sensory sensitivities including aversions to certain tastes/smells (ie: sea food), sound sensetivities and needs to wear ear buds (ie: loud conversations, sleep), phobia related to dirty bathrooms, seeks movement such as rocking or fidgets when nervous. Denies visual sensitivities. She reports occasional tactile sensitivities but this is improving   She has concerns with autism-like tendencies including challenges communicating and responding appropriately in social situations.       POSTURE: WFL  RANGE OF MOTION: UE AROM WFL  STRENGTH: UE Strength WFL  MUSCLE TONE: WFL  COORDINATION: WFL  BALANCE: WFL    FUNCTIONAL MOBILITY  Assistive Device(s): None  Ambulation: IND    BED MOBILITY: Independent    TRANSFERS: Independent    BATHING: Independent    UPPER BODY DRESSING: Independent    LOWER BODY DRESSING: Independent    TOILETING: Independent    GROOMING: Independent    EATING/SELF FEEDING: Independent     ACTIVITY TOLERANCE: Impacted  by anxiety and sensory sensetivities    INSTRUMENTAL ACTIVITIES OF DAILY LIVING (IADL): independent in IADL's, however, tolerance impacted by sensory needs    Assessment & Plan   CLINICAL IMPRESSIONS  Medical Diagnosis: Sensory processing difficulty (F88)    Treatment Diagnosis: sensory sensetivities    Impression/Assessment:Patient is a 18 year old female who was referred to outpatient occupational therapy at the request of Kehr, Kristen M, PA-C due to sensory and autism concerns. The following significant findings have been identified:  sensory sensitivity, anxiety, social concerns  .  These identified deficits interfere with their ability to perform  social situations dynamic environments  as compared to previous level of function.  Recommend [facility] to address sensory and autism concerns and pt verbalized understanding and was appreciative.     Clinical Decision Making (Complexity):  Assessment of Occupational Performance: 1-3 Performance Deficits  Occupational Performance Limitations: social participation and dynamic environments  Clinical Decision Making (Complexity): Low complexity    PLAN OF CARE  Treatment Interventions:  Interventions: Therapeutic Activity    Long Term Goals          Frequency of Treatment: 1 visit  Duration of Treatment: 1 visit     Recommended Referrals to Other Professionals:  none at time of eval - recommending [facility] to address sensory and autism concerns  Education Assessment: Learner/Method: Patient;Listening  Education Comments: OT role, scope of practice     Risks and benefits of evaluation/treatment have been explained.   Patient/Family/caregiver agrees with Plan of Care.     Evaluation Time:        Signing Clinician: LISA Mckeon/L, CNS, CSRS        [facility]                                                                                   OUTPATIENT OCCUPATIONAL THERAPY      PLAN OF TREATMENT FOR OUTPATIENT REHABILITATION   Patient's Last  Name, First Name, Cyndie Mendes YOB: 2005   Provider's Name   [facility]   Medical Record No.  [id number]     Onset Date: 08/22/24 (order date) Start of Care Date: 08/27/24     Medical Diagnosis:  Sensory processing difficulty (F88)      OT Treatment Diagnosis:  sensory sensetivities Plan of Treatment  Frequency/Duration:1 visit/1 visit    Certification date from 08/27/24   To 08/27/24        See note for plan of treatment details and functional goals     Merry Roger, OTR/L, CNS, CSRS                         I CERTIFY THE NEED FOR THESE SERVICES FURNISHED UNDER        THIS PLAN OF TREATMENT AND WHILE UNDER MY CARE     (Physician attestation of this document indicates review and certification of the therapy plan).              Referring Provider:  Kristen M Kehr    Initial Assessment  See Epic Evaluation- 08/27/24

## 2024-09-03 ENCOUNTER — VIRTUAL VISIT (OUTPATIENT)
Dept: FAMILY MEDICINE | Facility: CLINIC | Age: 19
End: 2024-09-03
Payer: COMMERCIAL

## 2024-09-03 DIAGNOSIS — F41.9 ANXIETY AND DEPRESSION: ICD-10-CM

## 2024-09-03 DIAGNOSIS — F32.A ANXIETY AND DEPRESSION: ICD-10-CM

## 2024-09-03 PROCEDURE — 99213 OFFICE O/P EST LOW 20 MIN: CPT | Mod: 95 | Performed by: PHYSICIAN ASSISTANT

## 2024-09-03 RX ORDER — ESCITALOPRAM OXALATE 10 MG/1
TABLET ORAL
Qty: 90 TABLET | Refills: 3 | Status: SHIPPED | OUTPATIENT
Start: 2024-09-03

## 2024-09-03 ASSESSMENT — ANXIETY QUESTIONNAIRES
GAD7 TOTAL SCORE: 13
7. FEELING AFRAID AS IF SOMETHING AWFUL MIGHT HAPPEN: MORE THAN HALF THE DAYS
8. IF YOU CHECKED OFF ANY PROBLEMS, HOW DIFFICULT HAVE THESE MADE IT FOR YOU TO DO YOUR WORK, TAKE CARE OF THINGS AT HOME, OR GET ALONG WITH OTHER PEOPLE?: SOMEWHAT DIFFICULT

## 2024-09-03 NOTE — PROGRESS NOTES
Cyndie is a 18 year old who is being evaluated via a billable video visit.    How would you like to obtain your AVS? MyChart  If the video visit is dropped, the invitation should be resent by: Send to e-mail at: [email or web address]  Will anyone else be joining your video visit? No      Assessment & Plan     Anxiety and depression  Continue with the lexapro at the current dose.   She has a habit of discontinuing her medications on her own. Advised to continue at this time and have follow up in 3-6 months for medication check.   She will also continue to work with counseling.   - escitalopram (LEXAPRO) 10 MG tablet; 1 tablet daily                Subjective   Cyndie is a 18 year old, presenting for the following health issues:  Depression        9/3/2024     9:11 AM   Additional Questions   Roomed by Amanuel MENDEZ MA       Video Start Time:  10:40 AM    History of Present Illness       Mental Health Follow-up:  Patient presents to follow-up on Depression & Anxiety.Patient's depression since last visit has been:  Good  The patient is not having other symptoms associated with depression.  Patient's anxiety since last visit has been:  Medium  The patient is not having other symptoms associated with anxiety.  Any significant life events: job concerns, financial concerns and grief or loss  Patient is feeling anxious or having panic attacks.  Patient has no concerns about alcohol or drug use.    She eats 2-3 servings of fruits and vegetables daily.She consumes 1 sweetened beverage(s) daily.She exercises with enough effort to increase her heart rate 10 to 19 minutes per day.  She exercises with enough effort to increase her heart rate 4 days per week.   She is taking medications regularly.     Cyndie is here for follow up after starting the lexapro. She is taking 10 mg and feels that is is helping. She has more motivation and not as many ups and downs with her moods. She recently got a new job and will be starting this week.  She continues to work with counseling.           Review of Systems  Constitutional, HEENT, cardiovascular, pulmonary, GI, , musculoskeletal, neuro, skin, endocrine and psych systems are negative, except as otherwise noted.      Objective           Vitals:  No vitals were obtained today due to virtual visit.    Physical Exam   GENERAL: alert and no distress  EYES: Eyes grossly normal to inspection.  No discharge or erythema, or obvious scleral/conjunctival abnormalities.  RESP: No audible wheeze, cough, or visible cyanosis.    SKIN: Visible skin clear. No significant rash, abnormal pigmentation or lesions.  NEURO: Cranial nerves grossly intact.  Mentation and speech appropriate for age.  PSYCH: Appropriate affect, tone, and pace of words          Video-Visit Details    Type of service:  Video Visit   Video End Time:10:54 AM  Originating Location (pt. Location): Home    Distant Location (provider location):  On-site  Platform used for Video Visit: Lorenzo  Signed Electronically by: Kristen M. Kehr, PA-C

## 2024-09-03 NOTE — PATIENT INSTRUCTIONS
Continue with the lexapro 10 mg daily    I will see you back in 3-6 months for medication check.       Continue to work with Jazmyn Cheatham in counseling and make the appointment with Shravan

## 2024-09-03 NOTE — Clinical Note
Please reach out to Cyndie and schedule a virtual visit for medication check in 3-6 months.  Kristen Kehr PA-C

## 2024-09-18 ENCOUNTER — TELEPHONE (OUTPATIENT)
Dept: BEHAVIORAL HEALTH | Facility: CLINIC | Age: 19
End: 2024-09-18
Payer: COMMERCIAL

## 2024-09-18 NOTE — TELEPHONE ENCOUNTER
[facility] services as she expressed interest in the program. [facility] received voicemail and left a message requesting a call back to discuss the program. [facility] sent program information to patient's Mychart already, so no need to send again at this time.     [facility] received a call back from patient and she reports she's not interested in [facility] at this time. She reports she would like to get scheduled with [facility] again, so [facility] gave her the scheduling line and let Jazmyn know she's trying to reschedule with her.     Keri Perez, [facility]  [facility] [place] ([facility])   [facility], [place], [place], & [facility]  [phone]

## 2024-10-17 ENCOUNTER — VIRTUAL VISIT (OUTPATIENT)
Dept: PSYCHOLOGY | Facility: CLINIC | Age: 19
End: 2024-10-17
Payer: COMMERCIAL

## 2024-10-17 DIAGNOSIS — F41.1 GAD (GENERALIZED ANXIETY DISORDER): Primary | ICD-10-CM

## 2024-10-17 DIAGNOSIS — F43.9 TRAUMA AND STRESSOR-RELATED DISORDER: ICD-10-CM

## 2024-10-17 DIAGNOSIS — F33.1 MODERATE EPISODE OF RECURRENT MAJOR DEPRESSIVE DISORDER (H): ICD-10-CM

## 2024-10-17 PROCEDURE — 90832 PSYTX W PT 30 MINUTES: CPT | Mod: 95

## 2024-10-17 NOTE — PROGRESS NOTES
[facility]                                     Progress Note    Patient Name: Cyndie Mae  Date: 10/17/24         Service Type: Individual      Session Start Time: 3:08P  Session End Time: 3:35P     Session Length: 27 minutes    Session #: 5    Attendees: Client attended alone    Service Modality:  Video Visit:      Provider verified identity through the following two step process.  Patient provided:  Patient is known previously to provider    Telemedicine Visit: The patient's condition can be safely assessed and treated via synchronous audio and visual telemedicine encounter.      Reason for Telemedicine Visit: Patient has requested telehealth visit    Originating Site (Patient Location): Patient's home    Distant Site (Provider Location): Provider Remote Setting- Home Office    Consent:  The patient/guardian has verbally consented to: the potential risks and benefits of telemedicine (video visit) versus in person care; bill my insurance or make self-payment for services provided; and responsibility for payment of non-covered services.     Patient would like the video invitation sent by:  My Chart    Mode of Communication:  Video Conference via [facility]    Distant Location (Provider):  Off-site    As the provider I attest to compliance with applicable laws and regulations related to telemedicine.    DATA  Interactive Complexity: No  Crisis: No        Progress Since Last Session (Related to Symptoms / Goals / Homework):   Symptoms: Improving pt is sleeping the same (poorly), improved eating, improved mood, less anxiety (no recent panic attacks), no interpersonal conflict recently.    Homework: Completed in session      Episode of Care Goals: Achieved / completed to satisfaction - MAINTENANCE (Working to maintain change, with risk of relapse); Intervened by continuing to positively reinforce healthy behavior choice      Current / Ongoing Stressors and Concerns:    Pt since last session got  "a job and gone back to school. 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 Work has been hectic and feels able to fulfill responsibilities. Pt says at times she can look at things that are very disorganized and tough. Pt will find herself crying out of frustration. Pt feels her anxiety has been better, more structure in pt life. Pt hasn't had any recent panic attacks. Pt is still living with grandma. Pt is not able to drive yet. Pt friend will help pt with transportation. Pt reports her sleeping \"has been terrible.\" Pt is \"constantly tired to point of falling asleep at work in the basement.\" Pt adds that eating has improved as Aunt will invite her to dinner often. Pt tried the occupational therapy sensory evaluation - \"basically talked\" and they told her they don't do sensory evaluations. Pt never contacted [facility] for ASD testing. Pt processed through weighing pros and cons with getting tested. Pt has limited contact with friends but notes they are \"all busy.\"      Treatment Objective(s) Addressed in This Session:   use distraction each time intrusive worry surfaces  Increase interest, engagement, and pleasure in doing things  Decrease frequency and intensity of feeling down, depressed, hopeless  Improve quantity and quality of night time sleep / decrease daytime naps  Improve diet, appetite, mindful eating, and / or meal planning  Identify negative self-talk and behaviors: challenge core beliefs, myths, and actions  Trauma sx mgmt     Intervention:   CBT: reframe negative thinking and improve stress mgmt, motivation    Motivational Interviewing    MI Intervention: Expressed Empathy/Understanding, Supported Autonomy, Collaboration, Evocation, Permission to raise concern or advise, Open-ended questions, and Reflections: simple and complex     Change Talk Expressed by the Patient: Desire to change Ability to change Reasons to change Need to change Committment to change " Activation Taking steps    Provider Response to Change Talk: E - Evoked more info from patient about behavior change and A - Affirmed patient's thoughts, decisions, or attempts at behavior change    Assessments completed prior to visit:    PHQ2:       8/22/2024     1:30 AM 7/24/2023    10:24 AM 7/19/2023    11:33 AM 3/20/2023     2:42 AM 3/20/2023     2:36 AM   PHQ-2 ( 1999 [employer])   Q1: Little interest or pleasure in doing things 1 1 1  1   Q2: Feeling down, depressed or hopeless 1 1 2  3   PHQ-2 Score 2 2 3  4   Q1: Little interest or pleasure in doing things Several days Several days Several days  Several days   Q2: Feeling down, depressed or hopeless Several days Several days More than half the days  Nearly every day   PHQ-2 Score 2 2 3 Incomplete 4     GAD7:       6/20/2023    12:44 AM 7/10/2023     1:04 AM 7/24/2023    10:25 AM 9/20/2023     7:51 AM 6/27/2024     9:29 PM 8/4/2024     1:30 PM 9/3/2024    12:34 AM   BRAN-7 SCORE   Total Score 14 (moderate anxiety) 14 (moderate anxiety) 15 (severe anxiety) 16 (severe anxiety) 19 (severe anxiety) 11 (moderate anxiety) 13 (moderate anxiety)   Total Score 14 14 15 16 19 11 13         ASSESSMENT: Current Emotional / Mental Status (status of significant symptoms):   Risk status (Self / Other harm or suicidal ideation)  Patient denies current homicidal ideation and behaviors.  Patient denies current self-injurious ideation and behaviors.    Patient denied risk behaviors associated with substance use.  Patient denies any high risk behaviors associated with mental health symptoms.  Patient reports the following current concerns for their personal safety: None.  Patient reports there are not firearms in the house.      A safety and risk management plan has not been developed at this time, however patient was encouraged to call [place] [phone] should there be a change in any of these risk factors.    Mental Status Exam:  Appearance:                Appropriate    Eye  Contact:               Good   Psychomotor:              Restless       Gait / station:           no problem  Attitude / Demeanor:   Cooperative  Interested Friendly Pleasant Attentive  Speech      Rate / Production:   Normal/ Responsive Talkative      Volume:                   Normal  volume      Language:               intact  Mood:                          Anxious  Depressed  Sad  Dysphoric Anhedonia  Affect:                          Blunted    Thought Content:        Clear  Rumination  (pt states SI is passive and chronic)  Thought Process:        Coherent  Logical       Associations:           No loosening of associations  Insight:                         Good   Judgment:                   Intact   Orientation:                 All  Attention/concentration:          Good     Medication Review:   No changes to current psychiatric medication(s)    Current Outpatient Medications   Medication Sig Dispense Refill    escitalopram (LEXAPRO) 10 MG tablet 1 tablet daily 90 tablet 3     No current facility-administered medications for this visit.       Medication Compliance:   Yes     Changes in Health Issues:   None reported     Chemical Use Review:   Substance Use: Chemical use reviewed, no active concerns identified      Tobacco Use: No current tobacco use.      Diagnosis:  1. BRAN (generalized anxiety disorder)    2. Moderate episode of recurrent major depressive disorder (H)    3. Trauma and stressor-related disorder      Collateral Reports Completed:   Not Applicable    PLAN: (Patient Tasks / Therapist Tasks / Other)    Writer encouraged pt to maintain current structure in her life, offering supports to self with sensory-based needs and anxiety triggers, ways to manage and navigate the anxiety.    Jazmyn Cheatham, [facility]  10/17/24                                                       ______________________________________________________________________    Individual Treatment Plan    Patient's Name: Cyndie SANCHEZ  "Carmelita  YOB: 2005    Date of Creation: 10/17/24  Date Treatment Plan Last Reviewed/Revised: NA    DSM5 Diagnoses:   296.32 (F33.1) Major Depressive Disorder, Recurrent Episode, Moderate   300.02 (F41.1) Generalized Anxiety Disorder  309.9 (F43.9) Unspecified Trauma and Stressor Related Disorder    Psychosocial / Contextual Factors:   \"Anxiety and depression. There are also potential concerns for OCD and autism.\" Pt lives with grandma, has a job, no license, limited relationships    PROMIS (reviewed every 90 days): 8/4/24    PROMIS 10-Global Health (only subscores and total score):       6/28/2023     1:18 AM 7/10/2023     1:05 AM 7/24/2023    10:26 AM 8/4/2024     1:31 PM   PROMIS-10 Scores Only   Global Mental Health Score 9 12 12 10   Global Physical Health Score 14 15 15 14   PROMIS TOTAL - SUBSCORES 23 27 27 24        Referral / Collaboration:  Referral to another professional/service is not indicated at this time..    Anticipated number of session for this episode of care: less then 3 sessions  Anticipation frequency of session:  PRN  Anticipated Duration of each session: 16-37 minutes  Treatment plan will be reviewed in 90 days or when goals have been changed.       MeasurableTreatment Goal(s) related to diagnosis / functional impairment(s)    Goal:  Patient will reduce symptoms of depression and suicidal thinking and increase life functioning; effectively reduce depressive symptoms as evidenced by a reduced PHQ9 score of 5 or less with occurrence of several days or less.     Objective #A:  will experience a reduction in depressed mood, will develop more effective coping skills to manage depressive symptoms and will develop healthy cognitive patterns and beliefs   Client will Increase interest, engagement, and pleasure in doing things  Decrease frequency and intensity of feeling down, depressed, hopeless  Identify negative self-talk and behaviors: challenge core beliefs, myths, and " actions  Decrease thoughts that you'd be better off dead or of suicide / self-harm.  Status: NEW 8/22/24     Objective #B:  will increase ability to function adaptively and will continue to take medications as prescribed / participate in supportive activities and services   Client will Increase interest, engagement, and pleasure in doing things  Improve quantity and quality of night time sleep / decrease daytime naps  Feel less tired and more energy during the day    Improve diet, appetite, mindful eating, and / or meal planning  Identify negative self-talk and behaviors: challenge core beliefs, myths, and actions  Improve concentration, focus, and mindfulness in daily activities .  Status: NEW 8/22/24     Objective #C:  will address relationship difficulties in a more adaptive manner  Client will examine relationship hx and learn skills to more effectively communicate and be assertive.  Status: NEW 8/22/24     Goal:  Patient will reduce symptoms and impacts of anxiety - generalized anxiety; effectively reduce anxiety symptoms as evidenced by a reduced GAD7 score of 5 or less with the occurrence of several days or less.     Objective #A:  will experience a reduction in anxiety, will develop   more effective coping skills to manage anxiety symptoms, will develop healthy cognitive patterns and beliefs and will increase ability to function adaptively              Client will use cognitive strategies identified in therapy to challenge anxious thoughts.  Status: NEW 8/22/24     Objective #B:  will experience a reduction in anxiety, will develop more effective coping skills to manage anxiety symptoms, will develop healthy cognitive patterns and beliefs and will increase ability to function adaptively  Client will use relaxation strategies many times per day to reduce the physical symptoms of anxiety.  Status: NEW 8/22/24     Intervention(s)  Psycho-education regarding mental health diagnoses and treatment options      Skills training  Explore skills useful to client in current situation  Skills include assertiveness, communication, conflict management, problem-solving, relaxation, etc.     Solution-Focused Therapy  Explore patterns in patient's relationships and discussed options for new behaviors  Explore patterns in patient's actions and choices and discussed options for new behaviors     Cognitive-behavioral Therapy  Discuss common cognitive distortions, identified them in patient's life  Explore ways to challenge, replace, and act against these cognitions     Acceptance and Commitment Therapy  Explore and identified important values in patient's life  Discuss ways to commit to behavioral activation around these values     Psychodynamic psychotherapy  Discuss patient's emotional dynamics and issues and how they impact behaviors  Explore patient's history of relationships and how they impact present behaviors  Explore how to work with and make changes in these schemas and patterns     Behavioral Activation  Discuss steps patient can take to become more involved in meaningful activity  Identify barriers to these activities and explored possible solutions     Mindfulness-Based Strategies  Discuss skills based on development and application of mindfulness  Skills drawn from dialectical behavior therapy, mindfulness-based stress reduction, mindfulness-based cognitive therapy, etc.      Patient has reviewed and agreed to the above plan.        SYD HERNANDEZ, [facility]                    8/22/24

## 2025-07-13 ENCOUNTER — HEALTH MAINTENANCE LETTER (OUTPATIENT)
Age: 20
End: 2025-07-13